# Patient Record
Sex: MALE | Race: BLACK OR AFRICAN AMERICAN | NOT HISPANIC OR LATINO | URBAN - METROPOLITAN AREA
[De-identification: names, ages, dates, MRNs, and addresses within clinical notes are randomized per-mention and may not be internally consistent; named-entity substitution may affect disease eponyms.]

---

## 2017-03-15 ENCOUNTER — EMERGENCY (EMERGENCY)
Facility: HOSPITAL | Age: 27
LOS: 0 days | Discharge: LEFT BEFORE DISCHARGE | End: 2017-03-15
Attending: EMERGENCY MEDICINE
Payer: COMMERCIAL

## 2017-03-15 VITALS
OXYGEN SATURATION: 98 % | WEIGHT: 190.04 LBS | RESPIRATION RATE: 18 BRPM | DIASTOLIC BLOOD PRESSURE: 110 MMHG | HEART RATE: 120 BPM | SYSTOLIC BLOOD PRESSURE: 186 MMHG | HEIGHT: 73 IN | TEMPERATURE: 98 F

## 2017-03-15 DIAGNOSIS — E10.9 TYPE 1 DIABETES MELLITUS WITHOUT COMPLICATIONS: ICD-10-CM
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
## 2017-03-15 DIAGNOSIS — S69.92XA UNSPECIFIED INJURY OF LEFT WRIST, HAND AND FINGER(S), INITIAL ENCOUNTER: ICD-10-CM

## 2017-03-15 DIAGNOSIS — R41.82 ALTERED MENTAL STATUS, UNSPECIFIED: ICD-10-CM
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
## 2017-03-15 LAB
ALBUMIN SERPL ELPH-MCNC: 4.5 G/DL — SIGNIFICANT CHANGE UP (ref 3.3–5)
ALP SERPL-CCNC: 102 U/L — SIGNIFICANT CHANGE UP (ref 40–120)
ALT FLD-CCNC: 33 U/L — SIGNIFICANT CHANGE UP (ref 12–78)
ANION GAP SERPL CALC-SCNC: 9 MMOL/L — SIGNIFICANT CHANGE UP (ref 5–17)
AST SERPL-CCNC: 24 U/L — SIGNIFICANT CHANGE UP (ref 15–37)
BILIRUB SERPL-MCNC: 0.7 MG/DL — SIGNIFICANT CHANGE UP (ref 0.2–1.2)
BUN SERPL-MCNC: 7 MG/DL — SIGNIFICANT CHANGE UP (ref 7–23)
CALCIUM SERPL-MCNC: 8.8 MG/DL — SIGNIFICANT CHANGE UP (ref 8.5–10.1)
CHLORIDE SERPL-SCNC: 102 MMOL/L — SIGNIFICANT CHANGE UP (ref 96–108)
CO2 SERPL-SCNC: 32 MMOL/L — HIGH (ref 22–31)
CREAT SERPL-MCNC: 0.86 MG/DL — SIGNIFICANT CHANGE UP (ref 0.5–1.3)
ETHANOL SERPL-MCNC: 168 MG/DL — HIGH (ref 0–10)
GLUCOSE SERPL-MCNC: 155 MG/DL — HIGH (ref 70–99)
POTASSIUM SERPL-MCNC: 3.4 MMOL/L — LOW (ref 3.5–5.3)
POTASSIUM SERPL-SCNC: 3.4 MMOL/L — LOW (ref 3.5–5.3)
PROT SERPL-MCNC: 8.3 GM/DL — SIGNIFICANT CHANGE UP (ref 6–8.3)
SODIUM SERPL-SCNC: 143 MMOL/L — SIGNIFICANT CHANGE UP (ref 135–145)

## 2017-03-15 PROCEDURE — 99284 EMERGENCY DEPT VISIT MOD MDM: CPT

## 2017-03-15 NOTE — ED PROVIDER NOTE - PHYSICAL EXAMINATION
Gen: Alert, NAD  Head: NC, AT   Eyes: PERRL, EOMI, normal lids/conjunctiva  ENT: normal hearing, patent oropharynx without erythema/exudate, uvula midline  Neck: supple, no tenderness, Trachea midline  Pulm: Bilateral BS, normal resp effort, no wheeze/stridor/retractions  CV: RRR, no M/R/G, 2+ radial and dp pulses bl, no edema  Abd: soft, NT/ND, +BS, no hepatosplenomegaly  Mskel: extremities x4 with normal ROM and no joint effusions. no ctl spine ttp.   Skin: no rash, no bruising   Neuro: AAOx3, no sensory/motor deficits, CN 2-12 intact. patient answering questions slowly and making unusual eye contact.
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
## 2017-03-15 NOTE — ED PROVIDER NOTE - MEDICAL DECISION MAKING DETAILS
Patient does not have externalized signs of new trauma and has no complaints. he does seem to be acting unusally. doubt this is a concussion, suspect alcohol use. will check chemistry and etoh level. Patient does not have externalized signs of new trauma and has no complaints. he does seem to be acting unusally. doubt this is a concussion, suspect alcohol use. will check chemistry and etoh level. etoh of 168 explains behavior changes. he eloped, however
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
## 2017-06-29 ENCOUNTER — APPOINTMENT (OUTPATIENT)
Dept: NEUROLOGY | Facility: CLINIC | Age: 27
End: 2017-06-29

## 2022-01-27 ENCOUNTER — EMERGENCY (EMERGENCY)
Facility: HOSPITAL | Age: 32
LOS: 0 days | Discharge: HOME | End: 2022-01-27
Attending: STUDENT IN AN ORGANIZED HEALTH CARE EDUCATION/TRAINING PROGRAM
Payer: COMMERCIAL

## 2022-01-27 VITALS
RESPIRATION RATE: 15 BRPM | DIASTOLIC BLOOD PRESSURE: 98 MMHG | OXYGEN SATURATION: 97 % | WEIGHT: 184.97 LBS | SYSTOLIC BLOOD PRESSURE: 151 MMHG | HEIGHT: 73 IN | TEMPERATURE: 98 F | HEART RATE: 118 BPM
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
## 2022-01-27 DIAGNOSIS — S09.90XA UNSPECIFIED INJURY OF HEAD, INITIAL ENCOUNTER: ICD-10-CM

## 2022-01-27 DIAGNOSIS — Z79.4 LONG TERM (CURRENT) USE OF INSULIN: ICD-10-CM

## 2022-01-27 DIAGNOSIS — R51.9 HEADACHE, UNSPECIFIED: ICD-10-CM

## 2022-01-27 DIAGNOSIS — E10.9 TYPE 1 DIABETES MELLITUS WITHOUT COMPLICATIONS: ICD-10-CM

## 2022-01-27 PROCEDURE — 99283 EMERGENCY DEPT VISIT LOW MDM: CPT

## 2022-01-27 RX ORDER — ACETAMINOPHEN 500 MG
650 TABLET ORAL ONCE
Refills: 0 | Status: COMPLETED | OUTPATIENT
Start: 2022-01-27 | End: 2022-01-27

## 2022-01-27 RX ORDER — ACETAMINOPHEN 500 MG
1 TABLET ORAL
Qty: 28 | Refills: 0
Start: 2022-01-27 | End: 2022-02-02

## 2022-01-27 RX ORDER — PROCHLORPERAZINE MALEATE 5 MG
1 TABLET ORAL
Qty: 20 | Refills: 0
Start: 2022-01-27 | End: 2022-01-31

## 2022-01-27 RX ORDER — PROCHLORPERAZINE MALEATE 5 MG
10 TABLET ORAL ONCE
Refills: 0 | Status: COMPLETED | OUTPATIENT
Start: 2022-01-27 | End: 2022-01-27
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
## 2022-01-27 NOTE — ED PROVIDER NOTE - PHYSICAL EXAMINATION
General: Awake, alert and oriented. No acute distress. Well developed, hydrated and nourished. Appears stated age.   Skin: Skin in warm, dry and intact without rashes or lesions. Appropriate color for ethnicity  HENMT: 1.5 cm well healing and scabbed laceration in superior left parietal region with no surrounding erythema ro active bleeding; bilateral external ears without swelling. no nasal discharge. moist oral mucosa. supple neck, trachea midline  EYES: Conjunctiva clear. nonicteric sclera. EOM intact, Eyelids are normal in appearance without swelling or lesions. PERRLA  Cardiac: well perfused, s1, s2, rrr  Respiratory: breathing comfortably on room air. no audible wheezing or stridor  Abdominal: nondistended  MSK: Neck and back are without deformity, visible external skin changes, or signs of trauma. Curvature of the cervical, thoracic, and lumbar spine are within normal limits. no external signs of trauma. no apparent deficits in ROM of any extremity  Neurological: The patient is awake, alert and oriented to person, place, and time with normal speech. CN 2-12 grossly intact. no apparent deficits. Memory is normal and thought process is intact. No gait abnormalities are appreciated.   Psychiatric: Appropriate mood and affect. Good judgement and insight. No visual or auditory hallucinations.

## 2022-01-27 NOTE — ED ADULT NURSE NOTE - WILL THE PATIENT ACCEPT THE PFIZER COVID-19 VACCINE IF ELIGIBLE AND IT IS AVAILABLE?
Activity:  Â· No strenuous activity (no pushing, pulling, bending, lifting, straining). Walking is allowed and stairs are ok. Diet:   Â· Advance to regular as tolerated. Medications:  Â· Take pain medication as prescribed, but realize that this medication can cause constipation. You should not operate heavy machinery with this medication. Â· You can take acetaminophen for less severe pain but do not take with any other pain medication that may also contain acetaminophen. Do not exceed the maximum daily dosage 4 g/day from all sources. Â· Take Colace or an over the counter stool softener to promote regular soft bowel movements. Avoid straining with bowel movements. Â· Avoid Aspirin/Ibuprofen products of any kind for 7 days as they can make bleeding worse. Wound Care:  Â· Change the dressing over the tube site as needed if it becomes soiled, leaking from around the tube is expected. Â· OK to shower, no bathing or swimming until neph tube is removed and incision is healed. Miscellaneous:  Â· Increase your oral fluid, it is important to drink as much fluid as possible. Â· Increase your fluid intake if your urine turns red or you pass clots to wash out any blood that may accumulate. Â· Leakage from around your flank catheter and stent is expected after this procedure. You will likely need to change the dressing from around your catheter several times a day as it will become saturated with drainage. Follow up:  Â· Follow-up with Dr. Willis Enamorado in 1 week for removal of ureteral stent. Â· Call clinic with any questions or concerns [phone]. CALL YOUR DOCTOR:  If you develop fever or chills. If you cannot urinate. If you develop nausea and are unable to drink fluids.
Yes
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
## 2022-01-27 NOTE — ED PROVIDER NOTE - CLINICAL SUMMARY MEDICAL DECISION MAKING FREE TEXT BOX
well appearing. neurologically intact. no clinical signs of ICH, low likelihood considering patients presentation. more likely is mild concussion. wound closed and healing, staples/sutures or other wound closure not indicated. received tetanus last night. will provide symptom control and dc
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
## 2022-01-27 NOTE — ED PROVIDER NOTE - PATIENT PORTAL LINK FT
You can access the FollowMyHealth Patient Portal offered by [facility] by registering at the following website: [email or web address]. By joining [facility]’s FollowMyHealth portal, you will also be able to view your health information using other applications (apps) compatible with our system.

## 2022-05-15 ENCOUNTER — INPATIENT (INPATIENT)
Facility: HOSPITAL | Age: 32
LOS: 2 days | Discharge: HOME | End: 2022-05-18
Attending: INTERNAL MEDICINE | Admitting: INTERNAL MEDICINE
Payer: SELF-PAY

## 2022-05-15 VITALS
HEART RATE: 137 BPM | RESPIRATION RATE: 20 BRPM | WEIGHT: 156.09 LBS | DIASTOLIC BLOOD PRESSURE: 97 MMHG | HEIGHT: 73 IN | OXYGEN SATURATION: 100 % | SYSTOLIC BLOOD PRESSURE: 159 MMHG | TEMPERATURE: 98 F

## 2022-05-15 DIAGNOSIS — K85.90 ACUTE PANCREATITIS WITHOUT NECROSIS OR INFECTION, UNSPECIFIED: ICD-10-CM

## 2022-05-15 DIAGNOSIS — K20.80 OTHER ESOPHAGITIS WITHOUT BLEEDING: ICD-10-CM

## 2022-05-15 DIAGNOSIS — E83.39 OTHER DISORDERS OF PHOSPHORUS METABOLISM: ICD-10-CM

## 2022-05-15 DIAGNOSIS — E86.0 DEHYDRATION: ICD-10-CM

## 2022-05-15 DIAGNOSIS — E78.5 HYPERLIPIDEMIA, UNSPECIFIED: ICD-10-CM

## 2022-05-15 DIAGNOSIS — F10.10 ALCOHOL ABUSE, UNCOMPLICATED: ICD-10-CM

## 2022-05-15 DIAGNOSIS — R74.01 ELEVATION OF LEVELS OF LIVER TRANSAMINASE LEVELS: ICD-10-CM

## 2022-05-15 DIAGNOSIS — E10.10 TYPE 1 DIABETES MELLITUS WITH KETOACIDOSIS WITHOUT COMA: ICD-10-CM

## 2022-05-15 DIAGNOSIS — K76.0 FATTY (CHANGE OF) LIVER, NOT ELSEWHERE CLASSIFIED: ICD-10-CM

## 2022-05-15 DIAGNOSIS — I10 ESSENTIAL (PRIMARY) HYPERTENSION: ICD-10-CM

## 2022-05-15 DIAGNOSIS — F12.99 CANNABIS USE, UNSPECIFIED WITH UNSPECIFIED CANNABIS-INDUCED DISORDER: ICD-10-CM

## 2022-05-15 DIAGNOSIS — Z79.4 LONG TERM (CURRENT) USE OF INSULIN: ICD-10-CM
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
## 2022-05-15 DIAGNOSIS — E87.6 HYPOKALEMIA: ICD-10-CM

## 2022-05-15 DIAGNOSIS — N17.9 ACUTE KIDNEY FAILURE, UNSPECIFIED: ICD-10-CM

## 2022-05-15 LAB
ALBUMIN SERPL ELPH-MCNC: 4.3 G/DL — SIGNIFICANT CHANGE UP (ref 3.3–5)
ALP SERPL-CCNC: 139 U/L — HIGH (ref 40–120)
ALT FLD-CCNC: 184 U/L — HIGH (ref 12–78)
AMPHET UR-MCNC: NEGATIVE — SIGNIFICANT CHANGE UP
ANION GAP SERPL CALC-SCNC: 19 MMOL/L — HIGH (ref 5–17)
ANION GAP SERPL CALC-SCNC: 36 MMOL/L — HIGH (ref 5–17)
ANISOCYTOSIS BLD QL: SLIGHT — SIGNIFICANT CHANGE UP
APPEARANCE UR: CLEAR — SIGNIFICANT CHANGE UP
AST SERPL-CCNC: 126 U/L — HIGH (ref 15–37)
BACTERIA # UR AUTO: ABNORMAL
BARBITURATES UR SCN-MCNC: NEGATIVE — SIGNIFICANT CHANGE UP
BASE EXCESS BLDA CALC-SCNC: -18.7 MMOL/L — LOW (ref -2–3)
BASE EXCESS BLDV CALC-SCNC: -7.7 MMOL/L — LOW (ref -2–3)
BASOPHILS # BLD AUTO: 0 K/UL — SIGNIFICANT CHANGE UP (ref 0–0.2)
BASOPHILS NFR BLD AUTO: 0 % — SIGNIFICANT CHANGE UP (ref 0–2)
BENZODIAZ UR-MCNC: NEGATIVE — SIGNIFICANT CHANGE UP
BILIRUB SERPL-MCNC: 1.1 MG/DL — SIGNIFICANT CHANGE UP (ref 0.2–1.2)
BILIRUB UR-MCNC: NEGATIVE — SIGNIFICANT CHANGE UP
BLOOD GAS COMMENTS ARTERIAL: SIGNIFICANT CHANGE UP
BLOOD GAS COMMENTS ARTERIAL: SIGNIFICANT CHANGE UP
BLOOD GAS COMMENTS, VENOUS: SIGNIFICANT CHANGE UP
BUN SERPL-MCNC: 10 MG/DL — SIGNIFICANT CHANGE UP (ref 7–23)
BUN SERPL-MCNC: 12 MG/DL — SIGNIFICANT CHANGE UP (ref 7–23)
CALCIUM SERPL-MCNC: 8.9 MG/DL — SIGNIFICANT CHANGE UP (ref 8.5–10.1)
CALCIUM SERPL-MCNC: 9.5 MG/DL — SIGNIFICANT CHANGE UP (ref 8.5–10.1)
CHLORIDE SERPL-SCNC: 84 MMOL/L — LOW (ref 96–108)
CHLORIDE SERPL-SCNC: 98 MMOL/L — SIGNIFICANT CHANGE UP (ref 96–108)
CO2 BLDA-SCNC: 7 MMOL/L — LOW (ref 19–24)
CO2 BLDV-SCNC: 18 MMOL/L — LOW (ref 22–26)
CO2 SERPL-SCNC: 11 MMOL/L — LOW (ref 22–31)
CO2 SERPL-SCNC: 16 MMOL/L — LOW (ref 22–31)
COCAINE METAB.OTHER UR-MCNC: NEGATIVE — SIGNIFICANT CHANGE UP
COLOR SPEC: YELLOW — SIGNIFICANT CHANGE UP
CREAT SERPL-MCNC: 1.03 MG/DL — SIGNIFICANT CHANGE UP (ref 0.5–1.3)
CREAT SERPL-MCNC: 1.54 MG/DL — HIGH (ref 0.5–1.3)
DIFF PNL FLD: NEGATIVE — SIGNIFICANT CHANGE UP
EGFR: 101 ML/MIN/1.73M2 — SIGNIFICANT CHANGE UP
EGFR: 62 ML/MIN/1.73M2 — SIGNIFICANT CHANGE UP
EOSINOPHIL # BLD AUTO: 0 K/UL — SIGNIFICANT CHANGE UP (ref 0–0.5)
EOSINOPHIL NFR BLD AUTO: 0 % — SIGNIFICANT CHANGE UP (ref 0–6)
ETHANOL SERPL-MCNC: <10 MG/DL — SIGNIFICANT CHANGE UP (ref 0–10)
FLUAV AG NPH QL: SIGNIFICANT CHANGE UP
FLUBV AG NPH QL: SIGNIFICANT CHANGE UP
GAS PNL BLDA: SIGNIFICANT CHANGE UP
GAS PNL BLDV: SIGNIFICANT CHANGE UP
GLUCOSE BLDC GLUCOMTR-MCNC: 218 MG/DL — HIGH (ref 70–99)
GLUCOSE BLDC GLUCOMTR-MCNC: 223 MG/DL — HIGH (ref 70–99)
GLUCOSE BLDC GLUCOMTR-MCNC: 244 MG/DL — HIGH (ref 70–99)
GLUCOSE BLDC GLUCOMTR-MCNC: 335 MG/DL — HIGH (ref 70–99)
GLUCOSE BLDC GLUCOMTR-MCNC: 389 MG/DL — HIGH (ref 70–99)
GLUCOSE BLDC GLUCOMTR-MCNC: 526 MG/DL — CRITICAL HIGH (ref 70–99)
GLUCOSE BLDC GLUCOMTR-MCNC: >600 MG/DL — CRITICAL HIGH (ref 70–99)
GLUCOSE SERPL-MCNC: 257 MG/DL — HIGH (ref 70–99)
GLUCOSE SERPL-MCNC: 644 MG/DL — CRITICAL HIGH (ref 70–99)
GLUCOSE UR QL: 1000 MG/DL
HCO3 BLDA-SCNC: 7 MMOL/L — CRITICAL LOW (ref 21–28)
HCO3 BLDV-SCNC: 17 MMOL/L — LOW (ref 22–28)
HCT VFR BLD CALC: 47.6 % — SIGNIFICANT CHANGE UP (ref 39–50)
HGB BLD-MCNC: 15.6 G/DL — SIGNIFICANT CHANGE UP (ref 13–17)
HOROWITZ INDEX BLDA+IHG-RTO: 21 — SIGNIFICANT CHANGE UP
HOROWITZ INDEX BLDV+IHG-RTO: 21 — SIGNIFICANT CHANGE UP
KETONES UR-MCNC: ABNORMAL
LACTATE SERPL-SCNC: 11.2 MMOL/L — CRITICAL HIGH (ref 0.7–2)
LACTATE SERPL-SCNC: 3.1 MMOL/L — HIGH (ref 0.7–2)
LEUKOCYTE ESTERASE UR-ACNC: NEGATIVE — SIGNIFICANT CHANGE UP
LIDOCAIN IGE QN: 613 U/L — HIGH (ref 73–393)
LYMPHOCYTES # BLD AUTO: 0.54 K/UL — LOW (ref 1–3.3)
LYMPHOCYTES # BLD AUTO: 3 % — LOW (ref 13–44)
MACROCYTES BLD QL: SIGNIFICANT CHANGE UP
MAGNESIUM SERPL-MCNC: 1.7 MG/DL — SIGNIFICANT CHANGE UP (ref 1.6–2.6)
MAGNESIUM SERPL-MCNC: 2.1 MG/DL — SIGNIFICANT CHANGE UP (ref 1.6–2.6)
MANUAL SMEAR VERIFICATION: SIGNIFICANT CHANGE UP
MCHC RBC-ENTMCNC: 32.8 G/DL — SIGNIFICANT CHANGE UP (ref 32–36)
MCHC RBC-ENTMCNC: 34.9 PG — HIGH (ref 27–34)
MCV RBC AUTO: 106.5 FL — HIGH (ref 80–100)
METHADONE UR-MCNC: NEGATIVE — SIGNIFICANT CHANGE UP
MONOCYTES # BLD AUTO: 1.25 K/UL — HIGH (ref 0–0.9)
MONOCYTES NFR BLD AUTO: 7 % — SIGNIFICANT CHANGE UP (ref 2–14)
NEUTROPHILS # BLD AUTO: 15.95 K/UL — HIGH (ref 1.8–7.4)
NEUTROPHILS NFR BLD AUTO: 79 % — HIGH (ref 43–77)
NEUTS BAND # BLD: 10 % — HIGH (ref 0–8)
NITRITE UR-MCNC: NEGATIVE — SIGNIFICANT CHANGE UP
NRBC # BLD: 0 /100 — SIGNIFICANT CHANGE UP (ref 0–0)
NRBC # BLD: SIGNIFICANT CHANGE UP /100 WBCS (ref 0–0)
OPIATES UR-MCNC: POSITIVE — SIGNIFICANT CHANGE UP
PCO2 BLDA: 17 MMHG — CRITICAL LOW (ref 32–46)
PCO2 BLDV: 30 MMHG — LOW (ref 42–55)
PCP SPEC-MCNC: SIGNIFICANT CHANGE UP
PCP UR-MCNC: NEGATIVE — SIGNIFICANT CHANGE UP
PH BLDA: 7.21 — LOW (ref 7.35–7.45)
PH BLDV: 7.35 — SIGNIFICANT CHANGE UP (ref 7.32–7.43)
PH UR: 5 — SIGNIFICANT CHANGE UP (ref 5–8)
PHOSPHATE SERPL-MCNC: 1.1 MG/DL — LOW (ref 2.5–4.5)
PLAT MORPH BLD: NORMAL — SIGNIFICANT CHANGE UP
PLATELET # BLD AUTO: 298 K/UL — SIGNIFICANT CHANGE UP (ref 150–400)
PO2 BLDA: 122 MMHG — HIGH (ref 83–108)
PO2 BLDV: 53 MMHG — HIGH (ref 25–45)
POTASSIUM SERPL-MCNC: 4.6 MMOL/L — SIGNIFICANT CHANGE UP (ref 3.5–5.3)
POTASSIUM SERPL-MCNC: 5.2 MMOL/L — SIGNIFICANT CHANGE UP (ref 3.5–5.3)
POTASSIUM SERPL-SCNC: 4.6 MMOL/L — SIGNIFICANT CHANGE UP (ref 3.5–5.3)
POTASSIUM SERPL-SCNC: 5.2 MMOL/L — SIGNIFICANT CHANGE UP (ref 3.5–5.3)
PROT SERPL-MCNC: 9 GM/DL — HIGH (ref 6–8.3)
PROT UR-MCNC: 15 MG/DL
RBC # BLD: 4.47 M/UL — SIGNIFICANT CHANGE UP (ref 4.2–5.8)
RBC # FLD: 12.7 % — SIGNIFICANT CHANGE UP (ref 10.3–14.5)
RBC BLD AUTO: ABNORMAL
SAO2 % BLDA: 99.6 % — HIGH (ref 94–98)
SAO2 % BLDV: 88 % — LOW (ref 94–98)
SARS-COV-2 RNA SPEC QL NAA+PROBE: SIGNIFICANT CHANGE UP
SODIUM SERPL-SCNC: 131 MMOL/L — LOW (ref 135–145)
SODIUM SERPL-SCNC: 133 MMOL/L — LOW (ref 135–145)
SP GR SPEC: 1.01 — SIGNIFICANT CHANGE UP (ref 1.01–1.02)
STOMATOCYTES BLD QL SMEAR: SLIGHT — SIGNIFICANT CHANGE UP
THC UR QL: POSITIVE — SIGNIFICANT CHANGE UP
TROPONIN I, HIGH SENSITIVITY RESULT: 41.8 NG/L — SIGNIFICANT CHANGE UP
UROBILINOGEN FLD QL: NEGATIVE MG/DL — SIGNIFICANT CHANGE UP
VARIANT LYMPHS # BLD: 1 % — SIGNIFICANT CHANGE UP (ref 0–6)
WBC # BLD: 17.92 K/UL — HIGH (ref 3.8–10.5)
WBC # FLD AUTO: 17.92 K/UL — HIGH (ref 3.8–10.5)

## 2022-05-15 PROCEDURE — 93010 ELECTROCARDIOGRAM REPORT: CPT

## 2022-05-15 PROCEDURE — 99291 CRITICAL CARE FIRST HOUR: CPT

## 2022-05-15 PROCEDURE — 71045 X-RAY EXAM CHEST 1 VIEW: CPT | Mod: 26

## 2022-05-15 PROCEDURE — 74176 CT ABD & PELVIS W/O CONTRAST: CPT | Mod: 26,MA

## 2022-05-15 RX ORDER — SODIUM CHLORIDE 9 MG/ML
1000 INJECTION, SOLUTION INTRAVENOUS ONCE
Refills: 0 | Status: COMPLETED | OUTPATIENT
Start: 2022-05-15 | End: 2022-05-15

## 2022-05-15 RX ORDER — SODIUM CHLORIDE 9 MG/ML
1000 INJECTION, SOLUTION INTRAVENOUS
Refills: 0 | Status: DISCONTINUED | OUTPATIENT
Start: 2022-05-15 | End: 2022-05-15

## 2022-05-15 RX ORDER — INSULIN HUMAN 100 [IU]/ML
7 INJECTION, SOLUTION SUBCUTANEOUS
Qty: 100 | Refills: 0 | Status: DISCONTINUED | OUTPATIENT
Start: 2022-05-15 | End: 2022-05-15

## 2022-05-15 RX ORDER — PANTOPRAZOLE SODIUM 20 MG/1
40 TABLET, DELAYED RELEASE ORAL ONCE
Refills: 0 | Status: COMPLETED | OUTPATIENT
Start: 2022-05-15 | End: 2022-05-15

## 2022-05-15 RX ORDER — SODIUM CHLORIDE 9 MG/ML
1000 INJECTION, SOLUTION INTRAVENOUS ONCE
Refills: 0 | Status: DISCONTINUED | OUTPATIENT
Start: 2022-05-15 | End: 2022-05-15

## 2022-05-15 RX ORDER — INSULIN HUMAN 100 [IU]/ML
10 INJECTION, SOLUTION SUBCUTANEOUS ONCE
Refills: 0 | Status: COMPLETED | OUTPATIENT
Start: 2022-05-15 | End: 2022-05-15

## 2022-05-15 RX ORDER — INSULIN HUMAN 100 [IU]/ML
8 INJECTION, SOLUTION SUBCUTANEOUS
Qty: 100 | Refills: 0 | Status: DISCONTINUED | OUTPATIENT
Start: 2022-05-15 | End: 2022-05-16

## 2022-05-15 RX ORDER — ACETAMINOPHEN 500 MG
1000 TABLET ORAL ONCE
Refills: 0 | Status: COMPLETED | OUTPATIENT
Start: 2022-05-15 | End: 2022-05-15

## 2022-05-15 RX ORDER — HEPARIN SODIUM 5000 [USP'U]/ML
5000 INJECTION INTRAVENOUS; SUBCUTANEOUS EVERY 12 HOURS
Refills: 0 | Status: DISCONTINUED | OUTPATIENT
Start: 2022-05-15 | End: 2022-05-15

## 2022-05-15 RX ORDER — MAGNESIUM SULFATE 500 MG/ML
2 VIAL (ML) INJECTION ONCE
Refills: 0 | Status: COMPLETED | OUTPATIENT
Start: 2022-05-15 | End: 2022-05-15

## 2022-05-15 RX ORDER — ONDANSETRON 8 MG/1
4 TABLET, FILM COATED ORAL ONCE
Refills: 0 | Status: COMPLETED | OUTPATIENT
Start: 2022-05-15 | End: 2022-05-15

## 2022-05-15 RX ORDER — MORPHINE SULFATE 50 MG/1
4 CAPSULE, EXTENDED RELEASE ORAL ONCE
Refills: 0 | Status: DISCONTINUED | OUTPATIENT
Start: 2022-05-15 | End: 2022-05-15

## 2022-05-15 RX ORDER — SODIUM CHLORIDE 9 MG/ML
2500 INJECTION, SOLUTION INTRAVENOUS ONCE
Refills: 0 | Status: COMPLETED | OUTPATIENT
Start: 2022-05-15 | End: 2022-05-15

## 2022-05-15 RX ORDER — SODIUM CHLORIDE 9 MG/ML
1000 INJECTION, SOLUTION INTRAVENOUS
Refills: 0 | Status: DISCONTINUED | OUTPATIENT
Start: 2022-05-15 | End: 2022-05-17

## 2022-05-15 RX ORDER — HEPARIN SODIUM 5000 [USP'U]/ML
5000 INJECTION INTRAVENOUS; SUBCUTANEOUS EVERY 8 HOURS
Refills: 0 | Status: DISCONTINUED | OUTPATIENT
Start: 2022-05-15 | End: 2022-05-18

## 2022-05-15 RX ORDER — CHLORHEXIDINE GLUCONATE 213 G/1000ML
1 SOLUTION TOPICAL
Refills: 0 | Status: DISCONTINUED | OUTPATIENT
Start: 2022-05-15 | End: 2022-05-18

## 2022-05-15 RX ORDER — MORPHINE SULFATE 50 MG/1
2 CAPSULE, EXTENDED RELEASE ORAL EVERY 4 HOURS
Refills: 0 | Status: DISCONTINUED | OUTPATIENT
Start: 2022-05-15 | End: 2022-05-17

## 2022-05-15 RX ORDER — PIPERACILLIN AND TAZOBACTAM 4; .5 G/20ML; G/20ML
3.38 INJECTION, POWDER, LYOPHILIZED, FOR SOLUTION INTRAVENOUS ONCE
Refills: 0 | Status: COMPLETED | OUTPATIENT
Start: 2022-05-15 | End: 2022-05-15

## 2022-05-15 RX ADMIN — ONDANSETRON 4 MILLIGRAM(S): 8 TABLET, FILM COATED ORAL at 17:17

## 2022-05-15 RX ADMIN — MORPHINE SULFATE 2 MILLIGRAM(S): 50 CAPSULE, EXTENDED RELEASE ORAL at 22:13

## 2022-05-15 RX ADMIN — SODIUM CHLORIDE 1000 MILLILITER(S): 9 INJECTION, SOLUTION INTRAVENOUS at 19:20

## 2022-05-15 RX ADMIN — Medication 85 MILLIMOLE(S): at 23:56

## 2022-05-15 RX ADMIN — INSULIN HUMAN 7 UNIT(S)/HR: 100 INJECTION, SOLUTION SUBCUTANEOUS at 19:02

## 2022-05-15 RX ADMIN — Medication 25 GRAM(S): at 23:02

## 2022-05-15 RX ADMIN — MORPHINE SULFATE 4 MILLIGRAM(S): 50 CAPSULE, EXTENDED RELEASE ORAL at 17:17

## 2022-05-15 RX ADMIN — SODIUM CHLORIDE 1000 MILLILITER(S): 9 INJECTION, SOLUTION INTRAVENOUS at 18:19

## 2022-05-15 RX ADMIN — PIPERACILLIN AND TAZOBACTAM 3.38 GRAM(S): 4; .5 INJECTION, POWDER, LYOPHILIZED, FOR SOLUTION INTRAVENOUS at 19:23

## 2022-05-15 RX ADMIN — PIPERACILLIN AND TAZOBACTAM 200 GRAM(S): 4; .5 INJECTION, POWDER, LYOPHILIZED, FOR SOLUTION INTRAVENOUS at 18:34

## 2022-05-15 RX ADMIN — PANTOPRAZOLE SODIUM 40 MILLIGRAM(S): 20 TABLET, DELAYED RELEASE ORAL at 16:48

## 2022-05-15 RX ADMIN — MORPHINE SULFATE 2 MILLIGRAM(S): 50 CAPSULE, EXTENDED RELEASE ORAL at 22:04

## 2022-05-15 RX ADMIN — INSULIN HUMAN 10 UNIT(S): 100 INJECTION, SOLUTION SUBCUTANEOUS at 18:57

## 2022-05-15 RX ADMIN — SODIUM CHLORIDE 125 MILLILITER(S): 9 INJECTION, SOLUTION INTRAVENOUS at 20:17

## 2022-05-15 RX ADMIN — HEPARIN SODIUM 5000 UNIT(S): 5000 INJECTION INTRAVENOUS; SUBCUTANEOUS at 22:17

## 2022-05-15 RX ADMIN — INSULIN HUMAN 8 UNIT(S)/HR: 100 INJECTION, SOLUTION SUBCUTANEOUS at 19:37

## 2022-05-15 RX ADMIN — Medication 1000 MILLIGRAM(S): at 20:20

## 2022-05-15 RX ADMIN — SODIUM CHLORIDE 125 MILLILITER(S): 9 INJECTION, SOLUTION INTRAVENOUS at 21:35

## 2022-05-15 RX ADMIN — SODIUM CHLORIDE 2500 MILLILITER(S): 9 INJECTION, SOLUTION INTRAVENOUS at 16:50

## 2022-05-15 RX ADMIN — ONDANSETRON 4 MILLIGRAM(S): 8 TABLET, FILM COATED ORAL at 16:47

## 2022-05-15 RX ADMIN — MORPHINE SULFATE 4 MILLIGRAM(S): 50 CAPSULE, EXTENDED RELEASE ORAL at 19:20

## 2022-05-15 RX ADMIN — SODIUM CHLORIDE 1000 MILLILITER(S): 9 INJECTION, SOLUTION INTRAVENOUS at 18:57

## 2022-05-15 RX ADMIN — Medication 400 MILLIGRAM(S): at 19:55

## 2022-05-15 NOTE — H&P ADULT - CRITICAL CARE ATTENDING COMMENT
pt admitted to ICU overnight. Seen and examined on rounds with ICU team 5/16    24 hr events:  weaned off insulin drip early AM as blood sugar dropped to the 90s on insulin drip + D5 1/2 NS.  off insulin drip on D5 1/2 NS blood sugar repeated remains in the 90s  lantus not given started on sliding scale coverage  pt feeling hungry  pt still with mild intermittent midsternal chest pain and bandlike abdominal pain  lipase rising    31M PMH type 1 DM (prior episode of DKA in Feb '22), HTN, HLD, alcohol abuse (reports drinking "a few times a week each time taking in 3-4 drinks of hard liquor", prior detox and alcohol programs but "went back to my old ways") presents to ED with nausea, abdominal pain, non bilious non bloody emesis and chest pain located in the midsternal/epigastric region. CT with esophagitis. Blood sugar 644, AG 36, Cr 1.54, HCO3: 11, pH 7.21, lactate 11, U tox (THC and opiates - pt however was given morphine in ED for pain). Admitted to ICU on insulin drip.    DX: DKA,  pancreatitis, esophagitis, LALO, dehydration, transaminitis, HTN    NEURO  awake, alert, oriented no acute issues  MVI/thiamine/folic acid  monitor on CIWA for signs of ETOH withdrawal  benzo as needed  currently calm and cooperative  mother reports pt to be depressed and drinking more, pt contemplating on alcohol cessation  offered psych eval and alcohol abuse programs, pt states "I'll let you know later if I want to do that"    CV  HTN: resume home BP medication ramipril  chest pain likely underlying esophagitis which may be from ETOH use    PULM  no acute issues    ENDO  DKA  off insulin drip overnight  blood sugar off drip, NPO, on D5 1/2 NS around 90 to 100  start insulin sliding scale coverage  if blood sugar improves will resume basal insulin   follow up HbA1C    GI  esophagitis and pancreatitis  likely alcohol related  elevated liver enzymes may also be in setting of alcohol intake/use  monitor lipase  clear liquid diet as tolerates, if lipase worsens or abdominal pain worsens will keep NPO  maintain on IVF hydration for now  GI eval for esophagitis ? need for endoscopic eval vs supportive care    RENAL  LALO in setting of dehydration in setting of DKA and osmotic diuresis  s/p IVF hydration  Cr improved  monitor electrolytes  supplement hypophosphatemia    ID  leukocytosis in setting of DKA and pancreatitis  observe off antibx  monitor for fevers  follow up blood cx    GEN  full code  DVT prophylaxis heparin sc

## 2022-05-15 NOTE — ED PROVIDER NOTE - OBJECTIVE STATEMENT
31 years old male here with mom c/o nausea vomiting and sob then mid chest pain since this morning, Pt has a hx of type I diabetes take Lantus 45 units at night and humolog three meals daily. Pt denies headache, dizziness, blurred visions, light sensitivities, focal/distal weakness or numbness, neck.back/hips/calfs pain, cough, fever, chills, abd pain, dysuria or irregular bowel movements.

## 2022-05-15 NOTE — H&P ADULT - ASSESSMENT
30yo M with PMH of type 1 DM (prior episode of DKA in Feb '22), HTN,  presents to ED with nausea, NBNB vomiting, abdominal pain, HA & chest pain since this morning. Pt states symptoms are similar to last DKA episode. In ED found to have hyperglycemia, metabolic acidosis, LALO. Will admit to ICU for management of DKA.     -Neuro: HA resolved, no acute issues.  -Cardio: Hemodynamically stable, tachycardia likely 2/2 dehydration. Maintain MAP>65. May need to restart home antihypertensives.   -Resp: No acute issues, saturating well on RA, CXR grossly clear.   -GI: Abdomen diffusely mildly tender to palpation. Lipase elevated, hx of EtOH pancreatitis on last admission per mother; CT A/P performed, read pending. NPO, hydration with IVF, zofran for nausea, analgesia. Will trend lactate.   -Renal: LALO likely prerenal in setting of dehydration. Continue fluid resuscitation, trend BUN/Cr, monitor UO. Avoid nephrotoxic agents.   -ID: Leukocytosis 17k, afebrile in ED. F/u UA, uxc, bcx, PCT. S/p zosyn x1 in ED. Trend WBC, monitor temp curve.   -Heme: SCDs, HSC for DVT ppx.  -Endocrine: Likely DKA; continue insulin gtt and IVF resuscitation. FS q1h, frequent BMPs, aggressive electrolyte repletion. When FS<250 will add dextrose to IVF. Bridge to long-acting insulin when AG closed. Pt takes lantus 45 units at home. Endo consult.   -Dispo: Admit to ICU while on insulin gtt for q1h FS   30yo M with PMH of type 1 DM (prior episode of DKA in Feb '22), HTN,  presents to ED with nausea, NBNB vomiting, abdominal pain, HA & chest pain since this morning. Pt states symptoms are similar to last DKA episode. In ED found to have hyperglycemia, metabolic acidosis, LALO. Will admit to ICU for management of DKA.     -Neuro: HA resolved, no acute issues.  -Cardio: Hemodynamically stable, tachycardia likely 2/2 dehydration. Maintain MAP>65. May need to restart home antihypertensives.   -Resp: No acute issues, saturating well on RA, CXR grossly clear.   -GI: Abdomen diffusely mildly tender to palpation. Lipase elevated, hx of EtOH pancreatitis on last admission per mother; CT A/P performed, read pending. NPO, hydration with IVF, zofran for nausea, analgesia. Will trend lactate.   -Renal: LALO likely prerenal in setting of dehydration. Continue fluid resuscitation, trend BUN/Cr, monitor UO. Avoid nephrotoxic agents.   -ID: Leukocytosis 17k, afebrile in ED. F/u UA, uxc, bcx, PCT. S/p zosyn x1 in ED. Trend WBC, monitor temp curve.   -Heme: SCDs, HSC for DVT ppx.  -Endocrine: Likely DKA; continue insulin gtt and IVF resuscitation. FS q1h, frequent BMPs, aggressive electrolyte repletion. When FS<250 will add dextrose to IVF. Bridge to long-acting insulin when AG closed. Pt takes lantus 45 units at home. Endo consult.   -Tox/Psych: Pt endorses drinking alcohol (hard liquor 3-4 days per week). Will monitor ciwa; thiamine, folate, MVI. Utox pending. If pt amenable will consider psych consult for possible depression/substance abuse.  -Dispo: Admit to ICU while on insulin gtt for q1h FS   32yo M with PMH of type 1 DM (prior episode of DKA in Feb '22), HTN,  presents to ED with nausea, NBNB vomiting, abdominal pain, HA & chest pain since this morning. Pt states symptoms are similar to last DKA episode. In ED found to have hyperglycemia, metabolic acidosis, LALO. Will admit to ICU for management of DKA.     -Neuro: HA resolved, no acute issues.  -Cardio: Hemodynamically stable, tachycardia likely 2/2 dehydration. Maintain MAP>65. May need to restart home antihypertensives. Complains of intermittent chest pain; EKG with no signs of acute ischemia, trop wnl.  -Resp: No acute issues, saturating well on RA, CXR grossly clear.   -GI: Abdomen diffusely mildly tender to palpation. Lipase elevated, hx of EtOH pancreatitis on last hospital admission per mother; CT A/P performed, read pending. NPO, hydration with IVF, zofran for nausea, analgesia. Will trend lactate.   -Renal: LALO likely prerenal in setting of dehydration. Continue fluid resuscitation, trend BUN/Cr, monitor UO. Avoid nephrotoxic agents.   -ID: Leukocytosis 17k, afebrile in ED. F/u UA, uxc, bcx, PCT. S/p zosyn x1 in ED. Trend WBC, monitor temp curve.   -Heme: SCDs, HSC for DVT ppx.  -Endocrine: Likely DKA; continue insulin gtt and IVF resuscitation. FS q1h, frequent BMPs, aggressive electrolyte repletion. When FS<250 will add dextrose to IVF. Bridge to long-acting insulin when AG closed. Pt takes lantus 45 units at home. Endo consult.   -Tox/Psych: Pt endorses drinking alcohol (hard liquor 3-4 days per week). Will monitor ciwa; thiamine, folate, MVI. Utox pending. If pt amenable will consider psych consult for possible depression/substance abuse. Prior charts indicate pt had ED psych eval in 2015; has been resistant to outpatient psych services in the past.   -Dispo: Admit to ICU while on insulin gtt for q1h FS   30yo M with PMH of type 1 DM (prior episode of DKA in Feb '22), HTN, HLD, EtOH abuse presents to ED with nausea, NBNB vomiting, abdominal pain, HA & chest pain since this morning. Pt states symptoms are similar to last DKA episode. In ED found to have hyperglycemia, metabolic acidosis, LALO. Will admit to ICU for management of DKA.     -Neuro: HA resolved, no acute issues.  -Cardio: Hemodynamically stable, tachycardia likely 2/2 dehydration. Maintain MAP>65. May need to restart home antihypertensives. Complains of intermittent chest pain; EKG with no signs of acute ischemia, trop wnl.  -Resp: No acute issues, saturating well on RA, CXR grossly clear.   -GI: Abdomen diffusely mildly tender to palpation. Lipase elevated, hx of EtOH pancreatitis on last hospital admission per mother; CT A/P performed, read pending. NPO, hydration with IVF, zofran for nausea, analgesia. Will trend lactate.   -Renal: LALO likely prerenal in setting of dehydration. Continue fluid resuscitation, trend BUN/Cr, monitor UO. Avoid nephrotoxic agents.   -ID: Leukocytosis 17k, afebrile in ED. F/u UA, uxc, bcx, PCT. S/p zosyn x1 in ED. Trend WBC, monitor temp curve.   -Heme: SCDs, HSC for DVT ppx.  -Endocrine: Likely DKA; continue insulin gtt and IVF resuscitation. FS q1h, frequent BMPs, aggressive electrolyte repletion. When FS<250 will add dextrose to IVF. Bridge to long-acting insulin when AG closed. Pt takes lantus 45 units at home. Endo consult.   -Tox/Psych: Pt endorses drinking alcohol (hard liquor 3-4 days per week). Will monitor ciwa; thiamine, folate, MVI. Utox pending. If pt amenable will consider psych consult for possible depression/substance abuse. Prior charts indicate pt had ED psych eval in 2015; has been resistant to outpatient psych services in the past.   -Dispo: Admit to ICU while on insulin gtt for q1h FS

## 2022-05-15 NOTE — ED ADULT NURSE NOTE - NSPATIENTFLAG_GEN_A_ER
Final Anesthesia Post-op Assessment    Patient: Boby Frazier  Procedure(s) Performed: RIGHT HAND LONG FINGER GANGLIONECTOMY  Anesthesia type: Monitor Anesthesia Care    Vital Last Value   Temperature 36.7 Â°C (98.1 Â°F) (07/12/17 1143)   Pulse 75 (07/12/17 1143)   Respiratory Rate 18 (07/12/17 1100)   Non-Invasive   Blood Pressure 167/81 (07/12/17 1143)   Arterial  Blood Pressure     Pulse Oximetry 97 % (07/12/17 1143)     Last 24 I/O:   Intake/Output Summary (Last 24 hours) at 07/12/17 1156  Last data filed at 07/12/17 1155   Gross per 24 hour   Intake              650 ml   Output                0 ml   Net              650 ml       PATIENT LOCATION: Phase II  POST-OP VITAL SIGNS: stable  LEVEL OF CONSCIOUSNESS: participates in exam, answers questions appropriately, alert and awake  RESPIRATORY STATUS: unassisted and spontaneous ventilation  CARDIOVASCULAR: blood pressure returned to baseline and stable  HYDRATION: euvolemic    PAIN MANAGEMENT: well controlled  NAUSEA: None  AIRWAY PATENCY: patent  POST-OP ASSESSMENT: no complications, patient tolerated procedure well with no complications, no evidence of recall and sufficiently recovered from acute administration of anesthesia effects and able to participate in evaluation  COMPLICATIONS: none  Comments: Patient reassessed and appropriate for discharge Purple DH (Discharge Huddle; Vulnerable Patient)

## 2022-05-15 NOTE — H&P ADULT - HISTORY OF PRESENT ILLNESS
30yo M with PMH of type 1 DM (prior episode of DKA in Feb '22), HTN,  presents to ED with nausea, NBNB vomiting, abdominal pain and chest pain since this morning. Pt states symptoms are similar to last DKA episode. Describes CP as 10/10, sudden onset, substernal, nonradiating; endorses SOB 2/2 pain. No numbness/tingling in extremities. Abdominal pain is generalized, nonradiating. Endorses multiple episodes of NBNB vomiting. Denies fevers, changes in bowel habits, dysuria, urinary frequency, hematuria. No recent travel, no sick contacts. Pt smokes marijuana. Drinks alcohol "hard liquor 3-4 times per week"; states he has experienced withdrawal symptoms in the past when trying to stop but no hospitalization, no seizures. Pt's mother states pt has been depressed lately and is drinking alcohol more frequently.   In ED pt found to have  30yo M with PMH of type 1 DM (prior episode of DKA in Feb '22), HTN,  presents to ED with nausea, NBNB vomiting, abdominal pain and chest pain since this morning. Pt states symptoms are similar to last DKA episode; states he has been taking his insulin as directed but says "I eat too much". Describes CP as 10/10, sudden onset, substernal, nonradiating; endorses SOB 2/2 pain. No numbness/tingling in extremities. Abdominal pain is generalized, nonradiating. Endorses multiple episodes of NBNB vomiting. Denies fevers, changes in bowel habits, dysuria, urinary frequency, hematuria. No recent travel, no sick contacts. Pt smokes marijuana. Drinks alcohol "hard liquor 3-4 times per week"; states he has experienced withdrawal symptoms in the past when trying to stop but no hospitalization, no seizures. Pt's mother states pt has been depressed lately and is drinking alcohol more frequently.   In ED pt found to have serum glucose 644, pH 7.2, bicarb 7, AG 36, Cr 1.5, LA 11, WBC 17k. ICu consulted for management of DKA.  30yo M with PMH of type 1 DM (prior episode of DKA in Feb '22), HTN, HLD, EtOH abuse presents to ED with nausea, NBNB vomiting, abdominal pain and chest pain since this morning. Pt states symptoms are similar to last DKA episode; states he has been taking his insulin as directed but says "I eat too much". Describes CP as 10/10, sudden onset, substernal, nonradiating; endorses SOB 2/2 pain. No numbness/tingling in extremities. Abdominal pain is generalized, nonradiating. Endorses multiple episodes of NBNB vomiting. Denies fevers, changes in bowel habits, dysuria, urinary frequency, hematuria. No recent travel, no sick contacts. Pt smokes marijuana. Drinks alcohol "hard liquor 3-4 times per week"; states he has experienced withdrawal symptoms in the past when trying to stop but no hospitalization, no seizures. Pt's mother states pt has been depressed lately and is drinking alcohol more frequently.   In ED pt found to have serum glucose 644, pH 7.2, bicarb 7, AG 36, Cr 1.5, LA 11, WBC 17k. ICu consulted for management of DKA.

## 2022-05-15 NOTE — ED ADULT NURSE REASSESSMENT NOTE - NS ED NURSE REASSESS COMMENT FT1
Received pt in stretcher A&Ox3, on cardiac monitoring. Insulin drip infusing at 7unit/hr. Admitted to ICU awaiting bed assignment.

## 2022-05-15 NOTE — ED ADULT NURSE NOTE - OBJECTIVE STATEMENT
pt presents to ed c/o abd pain nausea and vomiting as per mom pt has not been taking care of himself , not taking medications and not eating properly, hx typr 1 dm , Mom's  number Justina [phone] pt presents to ed c/o abd pain nausea and vomiting as per mom pt has not been taking care of himself , not taking medications and not eating well, hx type 1 dm , Mom's  number Justina [phone]

## 2022-05-15 NOTE — ED ADULT NURSE REASSESSMENT NOTE - NURSING NEURO ORIENTATION
Who is calling:  Daughter   Reason for Call:  Caller was informed of the message below. Caller verbalized understanding of the message below. Caller is questioning if the patient should take this medication up to 10 days only or should the patient take until the 30 pills is gone.  Date of last appointment with primary care: n/a  Okay to leave a detailed message: Yes  [phone]  
oriented to person, place and time
oriented to person, place and time

## 2022-05-15 NOTE — ED PROVIDER NOTE - CRITICAL CARE ATTENDING CONTRIBUTION TO CARE
31 years old came in with tachypnea rate of 30 at bed side and vomiting, accu check and abg ordered ICU consulted.

## 2022-05-15 NOTE — H&P ADULT - NSHPLABSRESULTS_GEN_ALL_CORE
15.6   17.92 )-----------( 298      ( 15 May 2022 17:33 )             47.6     05-15    131<L>  |  84<L>  |  12  ----------------------------<  644<HH>  5.2   |  11<L>  |  1.54<H>    Ca    9.5      15 May 2022 17:33  Mg     2.1     05-15    TPro  9.0<H>  /  Alb  4.3  /  TBili  1.1  /  DBili  x   /  AST  126<H>  /  ALT  184<H>  /  AlkPhos  139<H>  05-15    Lactate, Blood (05.15.22 @ 16:57)    Lactate, Blood: 11.2

## 2022-05-15 NOTE — CHART NOTE - NSCHARTNOTEFT_GEN_A_CORE
MICU team PA Note    pt c/o severe 9/10 Mid-sternal chest pain since morning time, 9/10, constant, sharp. pt states that pain worse with movement. Pt c/o sore throat since today morning. + Palpitations. Pt denies any dizziness/N/V/cough MICU team PA Note    pt c/o severe 9/10 Mid-sternal chest pain since morning time, 9/10, constant, sharp. pt states that pain worse with movement. Pt c/o sore throat since today morning. + Palpitations, intermittent. Pt states that he had the same chest pain 3 mos ago when he was admitted with DKA. pt states that pain is worse with movement. Pt states that last drink was yesterday, whisky- 3 drinks.  Pt denies any dizziness/N/V/cough/other complaints.     Vital Signs Last 24 Hrs  T(C): 36.8 (15 May 2022 20:47), Max: 36.9 (15 May 2022 19:15)  T(F): 98.3 (15 May 2022 20:47), Max: 98.4 (15 May 2022 19:15)  HR: 108 (15 May 2022 22:30) (108 - 137)  BP: 127/77 (15 May 2022 22:30) (127/77 - 159/97)  BP(mean): 88 (15 May 2022 22:30) (88 - 106)  RR: 20 (15 May 2022 22:30) (18 - 24)  SpO2: 99% (15 May 2022 22:30) (99% - 100%)    A/P"   __ pain possibly due to pancreatitis, and/or esophagitis     __ ECG -- sinus tachycardia, no ischemic changes,   -- throat with not exudate and/or swelling  -- hemodynamically stable,   -- pain did not improve with Ofirmev, __ pt states that pain improved with Morphine in ER, __ administer Morphine PRN   -- CT a/p - esophagitis,   __ consider GI consultations for the CT  findings,   __ continue monitoring, MICU team PA Note    pt c/o severe 9/10 Mid-sternal chest pain since morning time, 9/10, constant, sharp. pt states that pain worse with movement. Pt c/o sore throat since today morning. + Palpitations, intermittent. Pt states that he had the same chest pain 3 mos ago when he was admitted with DKA. pt states that pain is worse with movement. Pt states that last drink was yesterday, whisky- 3 drinks.  Pt denies any dizziness/N/V/cough/other complaints.     Vital Signs Last 24 Hrs  T(C): 36.8 (15 May 2022 20:47), Max: 36.9 (15 May 2022 19:15)  T(F): 98.3 (15 May 2022 20:47), Max: 98.4 (15 May 2022 19:15)  HR: 108 (15 May 2022 22:30) (108 - 137)  BP: 127/77 (15 May 2022 22:30) (127/77 - 159/97)  BP(mean): 88 (15 May 2022 22:30) (88 - 106)  RR: 20 (15 May 2022 22:30) (18 - 24)  SpO2: 99% (15 May 2022 22:30) (99% - 100%)    A/P:  __ pain possibly due to pancreatitis, and/or esophagitis     __ ECG -- sinus tachycardia, no ischemic changes, tele -- sinus tachycardia, no ectopy, on RA, non in acute respiratory  distress   -- hemodynamically stable,   -- throat with not exudate and/or swelling  -- pain did not improve with Ofirmev, __ pt states that pain improved with Morphine in ER, __ administer Morphine PRN   -- CT a/p - esophagitis,   __ consider GI consultations for the CT  findings,   __ continue monitoring,

## 2022-05-15 NOTE — H&P ADULT - NSHPPHYSICALEXAM_GEN_ALL_CORE
PHYSICAL EXAM  GENERAL: Pt lying in stretcher in NAD, well-groomed, well-developed.  HEENT: NCAT, PERRL, EOMI, dry mucous membranes. Neck supple, FROM, no JVD.   NERVOUS SYSTEM:  AAOx3, good concentration, moving all four extremities, no focal neuro deficits; strength & sensation intact in b/l upper & lower extremities.  CHEST/LUNG: CTABL, no w/r/r  HEART: +S1S2, regular rhythm, tachycardic, no m/r/g  ABDOMEN: Soft, diffuse mild TTP, +BS  EXTREMITIES:  2+ peripheral pulses; no c/c/e  SKIN: No rashes or lesions

## 2022-05-15 NOTE — H&P ADULT - NSHPSOCIALHISTORY_GEN_ALL_CORE
Lives at home with roommates. Unemployed at present. Smokes marijuana, Drinks alcohol 3-4 times per week.

## 2022-05-15 NOTE — H&P ADULT - NSHPROSALLOTHERNEGRD_GEN_ALL_CORE
Informed patient of information below.  Patient verbalized understanding.  Patient states she was in walk-in and given antibiotics and steroids for swollen glands of the neck.     All other review of systems negative, except as noted in HPI

## 2022-05-16 LAB
A1C WITH ESTIMATED AVERAGE GLUCOSE RESULT: 7 % — HIGH (ref 4–5.6)
ALBUMIN SERPL ELPH-MCNC: 3.2 G/DL — LOW (ref 3.3–5)
ALP SERPL-CCNC: 97 U/L — SIGNIFICANT CHANGE UP (ref 40–120)
ALT FLD-CCNC: 120 U/L — HIGH (ref 12–78)
ANION GAP SERPL CALC-SCNC: 11 MMOL/L — SIGNIFICANT CHANGE UP (ref 5–17)
AST SERPL-CCNC: 73 U/L — HIGH (ref 15–37)
B-OH-BUTYR SERPL-SCNC: 0.7 MMOL/L — HIGH
BASE EXCESS BLDV CALC-SCNC: 3.3 MMOL/L — HIGH (ref -2–3)
BASOPHILS # BLD AUTO: 0.01 K/UL — SIGNIFICANT CHANGE UP (ref 0–0.2)
BASOPHILS NFR BLD AUTO: 0.1 % — SIGNIFICANT CHANGE UP (ref 0–2)
BILIRUB SERPL-MCNC: 0.6 MG/DL — SIGNIFICANT CHANGE UP (ref 0.2–1.2)
BLOOD GAS COMMENTS, VENOUS: SIGNIFICANT CHANGE UP
BUN SERPL-MCNC: 7 MG/DL — SIGNIFICANT CHANGE UP (ref 7–23)
CALCIUM SERPL-MCNC: 9.1 MG/DL — SIGNIFICANT CHANGE UP (ref 8.5–10.1)
CHLORIDE BLDV-SCNC: 97 MMOL/L — LOW (ref 98–107)
CHLORIDE SERPL-SCNC: 100 MMOL/L — SIGNIFICANT CHANGE UP (ref 96–108)
CO2 BLDV-SCNC: 29 MMOL/L — HIGH (ref 22–26)
CO2 SERPL-SCNC: 24 MMOL/L — SIGNIFICANT CHANGE UP (ref 22–31)
CREAT SERPL-MCNC: 0.76 MG/DL — SIGNIFICANT CHANGE UP (ref 0.5–1.3)
CRP SERPL-MCNC: 14 MG/L — HIGH
CULTURE RESULTS: SIGNIFICANT CHANGE UP
EGFR: 125 ML/MIN/1.73M2 — SIGNIFICANT CHANGE UP
EOSINOPHIL # BLD AUTO: 0 K/UL — SIGNIFICANT CHANGE UP (ref 0–0.5)
EOSINOPHIL NFR BLD AUTO: 0 % — SIGNIFICANT CHANGE UP (ref 0–6)
ERYTHROCYTE [SEDIMENTATION RATE] IN BLOOD: 2 MM/HR — SIGNIFICANT CHANGE UP (ref 0–15)
ESTIMATED AVERAGE GLUCOSE: 154 MG/DL — HIGH (ref 68–114)
GAS PNL BLDV: 131 MMOL/L — LOW (ref 136–145)
GAS PNL BLDV: SIGNIFICANT CHANGE UP
GAS PNL BLDV: SIGNIFICANT CHANGE UP
GLUCOSE BLDC GLUCOMTR-MCNC: 104 MG/DL — HIGH (ref 70–99)
GLUCOSE BLDC GLUCOMTR-MCNC: 106 MG/DL — HIGH (ref 70–99)
GLUCOSE BLDC GLUCOMTR-MCNC: 138 MG/DL — HIGH (ref 70–99)
GLUCOSE BLDC GLUCOMTR-MCNC: 146 MG/DL — HIGH (ref 70–99)
GLUCOSE BLDC GLUCOMTR-MCNC: 146 MG/DL — HIGH (ref 70–99)
GLUCOSE BLDC GLUCOMTR-MCNC: 173 MG/DL — HIGH (ref 70–99)
GLUCOSE BLDC GLUCOMTR-MCNC: 177 MG/DL — HIGH (ref 70–99)
GLUCOSE BLDC GLUCOMTR-MCNC: 178 MG/DL — HIGH (ref 70–99)
GLUCOSE BLDC GLUCOMTR-MCNC: 206 MG/DL — HIGH (ref 70–99)
GLUCOSE BLDC GLUCOMTR-MCNC: 94 MG/DL — SIGNIFICANT CHANGE UP (ref 70–99)
GLUCOSE BLDC GLUCOMTR-MCNC: 96 MG/DL — SIGNIFICANT CHANGE UP (ref 70–99)
GLUCOSE BLDV-MCNC: 135 MG/DL — HIGH (ref 65–95)
GLUCOSE SERPL-MCNC: 127 MG/DL — HIGH (ref 70–99)
HCO3 BLDV-SCNC: 28 MMOL/L — SIGNIFICANT CHANGE UP (ref 22–28)
HCT VFR BLD CALC: 37.4 % — LOW (ref 39–50)
HCT VFR BLDA CALC: 40 % — SIGNIFICANT CHANGE UP (ref 37–47)
HGB BLD CALC-MCNC: 13.4 G/DL — SIGNIFICANT CHANGE UP (ref 12.6–17.4)
HGB BLD-MCNC: 13.2 G/DL — SIGNIFICANT CHANGE UP (ref 13–17)
HOROWITZ INDEX BLDV+IHG-RTO: 21 — SIGNIFICANT CHANGE UP
IMM GRANULOCYTES NFR BLD AUTO: 0.8 % — SIGNIFICANT CHANGE UP (ref 0–1.5)
LACTATE BLDV-MCNC: 1.5 MMOL/L — HIGH (ref 0.56–1.39)
LACTATE SERPL-SCNC: 1.1 MMOL/L — SIGNIFICANT CHANGE UP (ref 0.7–2)
LIDOCAIN IGE QN: 1866 U/L — HIGH (ref 73–393)
LYMPHOCYTES # BLD AUTO: 0.69 K/UL — LOW (ref 1–3.3)
LYMPHOCYTES # BLD AUTO: 5.8 % — LOW (ref 13–44)
MAGNESIUM SERPL-MCNC: 2.3 MG/DL — SIGNIFICANT CHANGE UP (ref 1.6–2.6)
MCHC RBC-ENTMCNC: 35.3 G/DL — SIGNIFICANT CHANGE UP (ref 32–36)
MCHC RBC-ENTMCNC: 35.4 PG — HIGH (ref 27–34)
MCV RBC AUTO: 100.3 FL — HIGH (ref 80–100)
MONOCYTES # BLD AUTO: 2.01 K/UL — HIGH (ref 0–0.9)
MONOCYTES NFR BLD AUTO: 17 % — HIGH (ref 2–14)
NEUTROPHILS # BLD AUTO: 9.03 K/UL — HIGH (ref 1.8–7.4)
NEUTROPHILS NFR BLD AUTO: 76.3 % — SIGNIFICANT CHANGE UP (ref 43–77)
NRBC # BLD: 0 /100 WBCS — SIGNIFICANT CHANGE UP (ref 0–0)
OTHER CELLS CSF MANUAL: 21 ML/DL — SIGNIFICANT CHANGE UP (ref 18–22)
PCO2 BLDV: 41 MMHG — LOW (ref 42–55)
PH BLDV: 7.44 — HIGH (ref 7.32–7.43)
PHOSPHATE SERPL-MCNC: 1.4 MG/DL — LOW (ref 2.5–4.5)
PLATELET # BLD AUTO: 225 K/UL — SIGNIFICANT CHANGE UP (ref 150–400)
PO2 BLDV: 56 MMHG — HIGH (ref 25–45)
POTASSIUM BLDV-SCNC: 3.7 MMOL/L — SIGNIFICANT CHANGE UP (ref 3.5–5.1)
POTASSIUM SERPL-MCNC: 3.6 MMOL/L — SIGNIFICANT CHANGE UP (ref 3.5–5.3)
POTASSIUM SERPL-SCNC: 3.6 MMOL/L — SIGNIFICANT CHANGE UP (ref 3.5–5.3)
PROCALCITONIN SERPL-MCNC: 0.14 NG/ML — HIGH (ref 0.02–0.1)
PROT SERPL-MCNC: 6.8 GM/DL — SIGNIFICANT CHANGE UP (ref 6–8.3)
RBC # BLD: 3.73 M/UL — LOW (ref 4.2–5.8)
RBC # FLD: 12 % — SIGNIFICANT CHANGE UP (ref 10.3–14.5)
SAO2 % BLDV: 90.5 % — LOW (ref 94–98)
SODIUM SERPL-SCNC: 135 MMOL/L — SIGNIFICANT CHANGE UP (ref 135–145)
SPECIMEN SOURCE: SIGNIFICANT CHANGE UP
TRIGL SERPL-MCNC: 67 MG/DL — SIGNIFICANT CHANGE UP
TSH SERPL-MCNC: 1.37 UIU/ML — SIGNIFICANT CHANGE UP (ref 0.36–3.74)
WBC # BLD: 11.84 K/UL — HIGH (ref 3.8–10.5)
WBC # FLD AUTO: 11.84 K/UL — HIGH (ref 3.8–10.5)

## 2022-05-16 PROCEDURE — 99291 CRITICAL CARE FIRST HOUR: CPT

## 2022-05-16 RX ORDER — LISINOPRIL 2.5 MG/1
40 TABLET ORAL DAILY
Refills: 0 | Status: DISCONTINUED | OUTPATIENT
Start: 2022-05-16 | End: 2022-05-18

## 2022-05-16 RX ORDER — DEXTROSE 50 % IN WATER 50 %
25 SYRINGE (ML) INTRAVENOUS ONCE
Refills: 0 | Status: DISCONTINUED | OUTPATIENT
Start: 2022-05-16 | End: 2022-05-17

## 2022-05-16 RX ORDER — SODIUM CHLORIDE 9 MG/ML
1000 INJECTION, SOLUTION INTRAVENOUS
Refills: 0 | Status: DISCONTINUED | OUTPATIENT
Start: 2022-05-16 | End: 2022-05-18

## 2022-05-16 RX ORDER — DEXTROSE 50 % IN WATER 50 %
12.5 SYRINGE (ML) INTRAVENOUS ONCE
Refills: 0 | Status: DISCONTINUED | OUTPATIENT
Start: 2022-05-16 | End: 2022-05-17

## 2022-05-16 RX ORDER — INSULIN LISPRO 100/ML
VIAL (ML) SUBCUTANEOUS EVERY 4 HOURS
Refills: 0 | Status: DISCONTINUED | OUTPATIENT
Start: 2022-05-16 | End: 2022-05-17

## 2022-05-16 RX ORDER — THIAMINE MONONITRATE (VIT B1) 100 MG
500 TABLET ORAL DAILY
Refills: 0 | Status: COMPLETED | OUTPATIENT
Start: 2022-05-16 | End: 2022-05-18

## 2022-05-16 RX ORDER — FOLIC ACID 0.8 MG
1 TABLET ORAL DAILY
Refills: 0 | Status: DISCONTINUED | OUTPATIENT
Start: 2022-05-16 | End: 2022-05-17

## 2022-05-16 RX ORDER — POTASSIUM PHOSPHATE, MONOBASIC POTASSIUM PHOSPHATE, DIBASIC 236; 224 MG/ML; MG/ML
30 INJECTION, SOLUTION INTRAVENOUS ONCE
Refills: 0 | Status: COMPLETED | OUTPATIENT
Start: 2022-05-16 | End: 2022-05-16

## 2022-05-16 RX ORDER — GLUCAGON INJECTION, SOLUTION 0.5 MG/.1ML
1 INJECTION, SOLUTION SUBCUTANEOUS ONCE
Refills: 0 | Status: DISCONTINUED | OUTPATIENT
Start: 2022-05-16 | End: 2022-05-18

## 2022-05-16 RX ORDER — DEXTROSE 50 % IN WATER 50 %
15 SYRINGE (ML) INTRAVENOUS ONCE
Refills: 0 | Status: DISCONTINUED | OUTPATIENT
Start: 2022-05-16 | End: 2022-05-17

## 2022-05-16 RX ADMIN — SODIUM CHLORIDE 125 MILLILITER(S): 9 INJECTION, SOLUTION INTRAVENOUS at 11:56

## 2022-05-16 RX ADMIN — MORPHINE SULFATE 2 MILLIGRAM(S): 50 CAPSULE, EXTENDED RELEASE ORAL at 08:20

## 2022-05-16 RX ADMIN — HEPARIN SODIUM 5000 UNIT(S): 5000 INJECTION INTRAVENOUS; SUBCUTANEOUS at 13:26

## 2022-05-16 RX ADMIN — HEPARIN SODIUM 5000 UNIT(S): 5000 INJECTION INTRAVENOUS; SUBCUTANEOUS at 21:31

## 2022-05-16 RX ADMIN — Medication 1 MILLIGRAM(S): at 11:55

## 2022-05-16 RX ADMIN — Medication 2: at 17:51

## 2022-05-16 RX ADMIN — MORPHINE SULFATE 2 MILLIGRAM(S): 50 CAPSULE, EXTENDED RELEASE ORAL at 21:46

## 2022-05-16 RX ADMIN — MORPHINE SULFATE 2 MILLIGRAM(S): 50 CAPSULE, EXTENDED RELEASE ORAL at 13:26

## 2022-05-16 RX ADMIN — SODIUM CHLORIDE 125 MILLILITER(S): 9 INJECTION, SOLUTION INTRAVENOUS at 19:12

## 2022-05-16 RX ADMIN — MORPHINE SULFATE 2 MILLIGRAM(S): 50 CAPSULE, EXTENDED RELEASE ORAL at 21:31

## 2022-05-16 RX ADMIN — MORPHINE SULFATE 2 MILLIGRAM(S): 50 CAPSULE, EXTENDED RELEASE ORAL at 13:55

## 2022-05-16 RX ADMIN — HEPARIN SODIUM 5000 UNIT(S): 5000 INJECTION INTRAVENOUS; SUBCUTANEOUS at 05:09

## 2022-05-16 RX ADMIN — POTASSIUM PHOSPHATE, MONOBASIC POTASSIUM PHOSPHATE, DIBASIC 83.33 MILLIMOLE(S): 236; 224 INJECTION, SOLUTION INTRAVENOUS at 06:06

## 2022-05-16 RX ADMIN — SODIUM CHLORIDE 125 MILLILITER(S): 9 INJECTION, SOLUTION INTRAVENOUS at 06:06

## 2022-05-16 RX ADMIN — CHLORHEXIDINE GLUCONATE 1 APPLICATION(S): 213 SOLUTION TOPICAL at 05:10

## 2022-05-16 RX ADMIN — MORPHINE SULFATE 2 MILLIGRAM(S): 50 CAPSULE, EXTENDED RELEASE ORAL at 07:52

## 2022-05-16 RX ADMIN — LISINOPRIL 40 MILLIGRAM(S): 2.5 TABLET ORAL at 19:11

## 2022-05-16 RX ADMIN — Medication 105 MILLIGRAM(S): at 11:55

## 2022-05-16 NOTE — CONSULT NOTE ADULT - ASSESSMENT
HPI:  32yo M with PMH of type 1 DM (prior episode of DKA in Feb '22), HTN, HLD, EtOH abuse presents to ED with nausea, NBNB vomiting, abdominal pain and chest pain since this morning. Pt states symptoms are similar to last DKA episode; states he has been taking his insulin as directed but says "I eat too much". Describes CP as 10/10, sudden onset, substernal, nonradiating; endorses SOB 2/2 pain. No numbness/tingling in extremities. Abdominal pain is generalized, nonradiating. Endorses multiple episodes of NBNB vomiting. Denies fevers, changes in bowel habits, dysuria, urinary frequency, hematuria. No recent travel, no sick contacts. Pt smokes marijuana. Drinks alcohol "hard liquor 3-4 times per week"; states he has experienced withdrawal symptoms in the past when trying to stop but no hospitalization, no seizures. Pt's mother states pt has been depressed lately and is drinking alcohol more frequently.   In ED pt found to have serum glucose 644, pH 7.2, bicarb 7, AG 36, Cr 1.5, LA 11, WBC 17k. ICu consulted for management of DKA.  (15 May 2022 18:52)  ----- As Above ------   Patient presented with a mid quadrant pain best described as dull / pressure like. Associated with N/V. Started the day prior to admission. He states that it is similar to the pain he had on admission when diagnsed with DKA. Patient was also diagnosed with ETOH pancreatitis in the past but it was a different type of pain. Denies any recent NSAIDs. Last ETOH was Saturday ( today is Monday ) History of ETOH abuse.   CT scan shows an essentially normal pancreas. Lipase 613 --> 1866   Patient feeling better today. ( pain better, No N/V ). No appetite but would not mind clear liquids.     A) Elevated Lipase - 613 --> 1866 Essentially normal pancreas by CT scan, different pain than previous pancreatitis - Elevation probably related to DKA rather than from the pancreas.  1) Supportive care for now ( f/u labs ) 2) Slowly advance diet as tolerated  B) Elevated transaminases - probably related to DKA -  Improving 1.1/126/184/139 --> 0.6/73/120/97 - supportive care for now . Additional blood work ordered  C) Esophagitis - Patient without any symptoms - Supportive care for now

## 2022-05-16 NOTE — PATIENT PROFILE ADULT - FUNCTIONAL ASSESSMENT - DAILY ACTIVITY 3.
[facility]    [address] [place] [id number]    Phone:  [phone]    Fax:  [phone]       Thank You for choosing us for your health care visit. We are glad to serve you and happy to provide you with this summary of your visit. Please help us to ensure we have accurate records. If you find anything that needs to be changed, please let our staff know as soon as possible.          Your Demographic Information     Patient Name Sex Babar Sanchez Male 1961       Ethnic Group Patient Race    Not of  or  Origin White      Your Visit Details     Date & Time Provider Department    2017 10:00 AM Jaylyn Maravilal NP [facility]      Your Upcoming Appointment*(Max 10)     Tuesday May 30, 2017  9:00 AM CDT   Follow-up Visit with Burak Tinoco MD   [facility] ([facility])    [address] [place], [place] [id number] [phone]            2017  1:30 PM CDT   Office Visit with SUSAN Huddleston   [facility]    [id number] [facility] [id number] [phone]              Your Vitals Were     BP Pulse Resp Height Weight SpO2    108/74 68 14 5' 10\" (1.778 m) 169 lb (76.7 kg) 98%    BMI Smoking Status                24.25 kg/m2 Never Smoker          Medications Prescribed or Re-Ordered Today     lisinopril (ZESTRIL) 2.5 MG tablet    Sig - Route: Take 1 tablet by mouth daily. - Oral    Class: Eprescribe    Pharmacy: [id number] - [address], [address]  #: [phone]      Your Current Medications Are        Disp Refills Start End    apixaban (ELIQUIS) 5 MG Tab 60 tablet 1 2017     Sig - Route: Take 1 tablet by mouth every 12 hours. - Oral    Class: Eprescribe    carvedilol (COREG) 6.25 MG tablet 60 tablet 1 2017     Sig - Route: Take 1  tablet by mouth 2 times daily (with meals). - Oral    Class: Eprescribe    digoxin (LANOXIN) 0.125 MG tablet 30 tablet 1 5/11/2017     Sig - Route: Take 1 tablet by mouth daily. - Oral    Class: Eprescribe    furosemide (LASIX) 40 MG tablet 30 tablet 11 5/11/2017     Sig - Route: Take 1 tablet by mouth daily. - Oral    Class: Eprescribe    RaNITidine HCl (RANITIDINE 75 PO)        Sig - Route: Take 1 tablet by mouth daily.  - Oral    Class: Historical Med    lisinopril (ZESTRIL) 2.5 MG tablet 30 tablet 11 5/16/2017     Sig - Route: Take 1 tablet by mouth daily. - Oral    Class: Eprescribe      Allergies     No Known Allergies      Problem List as of 5/16/2017     Palpitation    Encounter for cardioversion procedure    Cardiomyopathy (CMS/[facility])      Patient Portal Signup     Manage health care for you and your family anytime, anywhere with the new [employer], your free online resource for quick and easy access to personal health information, scheduling appointments, refilling prescriptions, viewing test results, paying bills and more.  Sign up for a free and secure account. Please follow the instructions below to securely complete your enrollment.     1. Go to [email or web address]  2. Click Sign Up Now   3. Enter the Activation Code when prompted     Activation Code: [email or web address]  Expires: 6/10/2017  8:42 AM    If you have questions related to [employer], you can email [email or web address] or call [phone] to talk to our [facility] staff.  For questions related to your health, contact your physician’s office.  Please remember to dial 911 for medical emergencies.              Patient Instructions     None       4 = No assist / stand by assistance

## 2022-05-16 NOTE — PATIENT PROFILE ADULT - FALL HARM RISK - HARM RISK INTERVENTIONS
Assistance with ambulation/Assistance OOB with selected safe patient handling equipment/Communicate Risk of Fall with Harm to all staff/Monitor for mental status changes/Monitor gait and stability/Reinforce activity limits and safety measures with patient and family/Tailored Fall Risk Interventions/Toileting schedule using arm’s reach rule for commode and bathroom/Use of alarms - bed, chair and/or voice tab/Visual Cue: Yellow wristband and red socks/Bed in lowest position, wheels locked, appropriate side rails in place/Call bell, personal items and telephone in reach/Instruct patient to call for assistance before getting out of bed or chair/Non-slip footwear when patient is out of bed/Mcdonald to call system/Physically safe environment - no spills, clutter or unnecessary equipment/Purposeful Proactive Rounding/Room/bathroom lighting operational, light cord in reach

## 2022-05-17 LAB
ALBUMIN SERPL ELPH-MCNC: 3.3 G/DL — SIGNIFICANT CHANGE UP (ref 3.3–5)
ALP SERPL-CCNC: 103 U/L — SIGNIFICANT CHANGE UP (ref 40–120)
ALT FLD-CCNC: 93 U/L — HIGH (ref 12–78)
ANION GAP SERPL CALC-SCNC: 10 MMOL/L — SIGNIFICANT CHANGE UP (ref 5–17)
AST SERPL-CCNC: 67 U/L — HIGH (ref 15–37)
BASOPHILS # BLD AUTO: 0.01 K/UL — SIGNIFICANT CHANGE UP (ref 0–0.2)
BASOPHILS NFR BLD AUTO: 0.1 % — SIGNIFICANT CHANGE UP (ref 0–2)
BILIRUB DIRECT SERPL-MCNC: 0.2 MG/DL — SIGNIFICANT CHANGE UP (ref 0–0.3)
BILIRUB INDIRECT FLD-MCNC: 0.4 MG/DL — SIGNIFICANT CHANGE UP (ref 0.2–1)
BILIRUB SERPL-MCNC: 0.6 MG/DL — SIGNIFICANT CHANGE UP (ref 0.2–1.2)
BUN SERPL-MCNC: 3 MG/DL — LOW (ref 7–23)
CALCIUM SERPL-MCNC: 9.3 MG/DL — SIGNIFICANT CHANGE UP (ref 8.5–10.1)
CHLORIDE SERPL-SCNC: 100 MMOL/L — SIGNIFICANT CHANGE UP (ref 96–108)
CHOLEST SERPL-MCNC: 217 MG/DL — HIGH
CO2 SERPL-SCNC: 26 MMOL/L — SIGNIFICANT CHANGE UP (ref 22–31)
CREAT SERPL-MCNC: 0.49 MG/DL — LOW (ref 0.5–1.3)
EGFR: 142 ML/MIN/1.73M2 — SIGNIFICANT CHANGE UP
EOSINOPHIL # BLD AUTO: 0 K/UL — SIGNIFICANT CHANGE UP (ref 0–0.5)
EOSINOPHIL NFR BLD AUTO: 0 % — SIGNIFICANT CHANGE UP (ref 0–6)
GLUCOSE BLDC GLUCOMTR-MCNC: 199 MG/DL — HIGH (ref 70–99)
GLUCOSE BLDC GLUCOMTR-MCNC: 200 MG/DL — HIGH (ref 70–99)
GLUCOSE BLDC GLUCOMTR-MCNC: 217 MG/DL — HIGH (ref 70–99)
GLUCOSE BLDC GLUCOMTR-MCNC: 246 MG/DL — HIGH (ref 70–99)
GLUCOSE BLDC GLUCOMTR-MCNC: 284 MG/DL — HIGH (ref 70–99)
GLUCOSE SERPL-MCNC: 205 MG/DL — HIGH (ref 70–99)
HAV IGM SER-ACNC: SIGNIFICANT CHANGE UP
HBV CORE IGM SER-ACNC: SIGNIFICANT CHANGE UP
HBV SURFACE AG SER-ACNC: SIGNIFICANT CHANGE UP
HCT VFR BLD CALC: 39.6 % — SIGNIFICANT CHANGE UP (ref 39–50)
HCV AB S/CO SERPL IA: 0.06 S/CO — SIGNIFICANT CHANGE UP (ref 0–0.99)
HCV AB SERPL-IMP: SIGNIFICANT CHANGE UP
HDLC SERPL-MCNC: 113 MG/DL — SIGNIFICANT CHANGE UP
HGB BLD-MCNC: 13.8 G/DL — SIGNIFICANT CHANGE UP (ref 13–17)
IMM GRANULOCYTES NFR BLD AUTO: 0.5 % — SIGNIFICANT CHANGE UP (ref 0–1.5)
LIDOCAIN IGE QN: 746 U/L — HIGH (ref 73–393)
LIPID PNL WITH DIRECT LDL SERPL: 81 MG/DL — SIGNIFICANT CHANGE UP
LYMPHOCYTES # BLD AUTO: 0.53 K/UL — LOW (ref 1–3.3)
LYMPHOCYTES # BLD AUTO: 4.9 % — LOW (ref 13–44)
MAGNESIUM SERPL-MCNC: 1.9 MG/DL — SIGNIFICANT CHANGE UP (ref 1.6–2.6)
MCHC RBC-ENTMCNC: 34.7 PG — HIGH (ref 27–34)
MCHC RBC-ENTMCNC: 34.8 G/DL — SIGNIFICANT CHANGE UP (ref 32–36)
MCV RBC AUTO: 99.5 FL — SIGNIFICANT CHANGE UP (ref 80–100)
MONOCYTES # BLD AUTO: 1.39 K/UL — HIGH (ref 0–0.9)
MONOCYTES NFR BLD AUTO: 12.8 % — SIGNIFICANT CHANGE UP (ref 2–14)
NEUTROPHILS # BLD AUTO: 8.87 K/UL — HIGH (ref 1.8–7.4)
NEUTROPHILS NFR BLD AUTO: 81.7 % — HIGH (ref 43–77)
NON HDL CHOLESTEROL: 104 MG/DL — SIGNIFICANT CHANGE UP
NRBC # BLD: 0 /100 WBCS — SIGNIFICANT CHANGE UP (ref 0–0)
PHOSPHATE SERPL-MCNC: 1.9 MG/DL — LOW (ref 2.5–4.5)
PLATELET # BLD AUTO: 211 K/UL — SIGNIFICANT CHANGE UP (ref 150–400)
POTASSIUM SERPL-MCNC: 3.4 MMOL/L — LOW (ref 3.5–5.3)
POTASSIUM SERPL-SCNC: 3.4 MMOL/L — LOW (ref 3.5–5.3)
PROT SERPL-MCNC: 6.8 GM/DL — SIGNIFICANT CHANGE UP (ref 6–8.3)
RBC # BLD: 3.98 M/UL — LOW (ref 4.2–5.8)
RBC # FLD: 12 % — SIGNIFICANT CHANGE UP (ref 10.3–14.5)
SODIUM SERPL-SCNC: 136 MMOL/L — SIGNIFICANT CHANGE UP (ref 135–145)
TRIGL SERPL-MCNC: 116 MG/DL — SIGNIFICANT CHANGE UP
WBC # BLD: 10.85 K/UL — HIGH (ref 3.8–10.5)
WBC # FLD AUTO: 10.85 K/UL — HIGH (ref 3.8–10.5)

## 2022-05-17 PROCEDURE — 99233 SBSQ HOSP IP/OBS HIGH 50: CPT

## 2022-05-17 PROCEDURE — 12345: CPT | Mod: NC

## 2022-05-17 RX ORDER — DEXTROSE 50 % IN WATER 50 %
12.5 SYRINGE (ML) INTRAVENOUS ONCE
Refills: 0 | Status: DISCONTINUED | OUTPATIENT
Start: 2022-05-17 | End: 2022-05-18

## 2022-05-17 RX ORDER — FOLIC ACID 0.8 MG
1 TABLET ORAL DAILY
Refills: 0 | Status: DISCONTINUED | OUTPATIENT
Start: 2022-05-17 | End: 2022-05-18

## 2022-05-17 RX ORDER — INSULIN LISPRO 100/ML
VIAL (ML) SUBCUTANEOUS AT BEDTIME
Refills: 0 | Status: DISCONTINUED | OUTPATIENT
Start: 2022-05-17 | End: 2022-05-18

## 2022-05-17 RX ORDER — DEXTROSE 50 % IN WATER 50 %
25 SYRINGE (ML) INTRAVENOUS ONCE
Refills: 0 | Status: DISCONTINUED | OUTPATIENT
Start: 2022-05-17 | End: 2022-05-18

## 2022-05-17 RX ORDER — INSULIN LISPRO 100/ML
VIAL (ML) SUBCUTANEOUS
Refills: 0 | Status: DISCONTINUED | OUTPATIENT
Start: 2022-05-17 | End: 2022-05-18

## 2022-05-17 RX ORDER — SODIUM CHLORIDE 9 MG/ML
1000 INJECTION, SOLUTION INTRAVENOUS
Refills: 0 | Status: DISCONTINUED | OUTPATIENT
Start: 2022-05-17 | End: 2022-05-18

## 2022-05-17 RX ORDER — POTASSIUM PHOSPHATE, MONOBASIC POTASSIUM PHOSPHATE, DIBASIC 236; 224 MG/ML; MG/ML
30 INJECTION, SOLUTION INTRAVENOUS ONCE
Refills: 0 | Status: COMPLETED | OUTPATIENT
Start: 2022-05-17 | End: 2022-05-17

## 2022-05-17 RX ORDER — ACETAMINOPHEN 500 MG
1000 TABLET ORAL ONCE
Refills: 0 | Status: COMPLETED | OUTPATIENT
Start: 2022-05-17 | End: 2022-05-17

## 2022-05-17 RX ORDER — GLUCAGON INJECTION, SOLUTION 0.5 MG/.1ML
1 INJECTION, SOLUTION SUBCUTANEOUS ONCE
Refills: 0 | Status: DISCONTINUED | OUTPATIENT
Start: 2022-05-17 | End: 2022-05-18

## 2022-05-17 RX ORDER — POTASSIUM CHLORIDE 20 MEQ
40 PACKET (EA) ORAL ONCE
Refills: 0 | Status: COMPLETED | OUTPATIENT
Start: 2022-05-17 | End: 2022-05-17

## 2022-05-17 RX ORDER — DEXTROSE 50 % IN WATER 50 %
15 SYRINGE (ML) INTRAVENOUS ONCE
Refills: 0 | Status: DISCONTINUED | OUTPATIENT
Start: 2022-05-17 | End: 2022-05-18

## 2022-05-17 RX ORDER — INSULIN GLARGINE 100 [IU]/ML
10 INJECTION, SOLUTION SUBCUTANEOUS ONCE
Refills: 0 | Status: COMPLETED | OUTPATIENT
Start: 2022-05-17 | End: 2022-05-17

## 2022-05-17 RX ORDER — INSULIN GLARGINE 100 [IU]/ML
10 INJECTION, SOLUTION SUBCUTANEOUS AT BEDTIME
Refills: 0 | Status: DISCONTINUED | OUTPATIENT
Start: 2022-05-18 | End: 2022-05-18

## 2022-05-17 RX ADMIN — Medication 1000 MILLIGRAM(S): at 16:50

## 2022-05-17 RX ADMIN — Medication 105 MILLIGRAM(S): at 11:28

## 2022-05-17 RX ADMIN — Medication 40 MILLIEQUIVALENT(S): at 03:25

## 2022-05-17 RX ADMIN — Medication 2: at 01:53

## 2022-05-17 RX ADMIN — HEPARIN SODIUM 5000 UNIT(S): 5000 INJECTION INTRAVENOUS; SUBCUTANEOUS at 14:23

## 2022-05-17 RX ADMIN — HEPARIN SODIUM 5000 UNIT(S): 5000 INJECTION INTRAVENOUS; SUBCUTANEOUS at 05:16

## 2022-05-17 RX ADMIN — Medication 400 MILLIGRAM(S): at 16:27

## 2022-05-17 RX ADMIN — Medication 4: at 16:26

## 2022-05-17 RX ADMIN — LISINOPRIL 40 MILLIGRAM(S): 2.5 TABLET ORAL at 05:16

## 2022-05-17 RX ADMIN — SODIUM CHLORIDE 125 MILLILITER(S): 9 INJECTION, SOLUTION INTRAVENOUS at 03:01

## 2022-05-17 RX ADMIN — POTASSIUM PHOSPHATE, MONOBASIC POTASSIUM PHOSPHATE, DIBASIC 83.33 MILLIMOLE(S): 236; 224 INJECTION, SOLUTION INTRAVENOUS at 03:25

## 2022-05-17 RX ADMIN — Medication 6: at 10:23

## 2022-05-17 RX ADMIN — Medication 1 MILLIGRAM(S): at 11:26

## 2022-05-17 RX ADMIN — MORPHINE SULFATE 2 MILLIGRAM(S): 50 CAPSULE, EXTENDED RELEASE ORAL at 05:35

## 2022-05-17 RX ADMIN — HEPARIN SODIUM 5000 UNIT(S): 5000 INJECTION INTRAVENOUS; SUBCUTANEOUS at 21:36

## 2022-05-17 RX ADMIN — INSULIN GLARGINE 10 UNIT(S): 100 INJECTION, SOLUTION SUBCUTANEOUS at 18:39

## 2022-05-17 RX ADMIN — MORPHINE SULFATE 2 MILLIGRAM(S): 50 CAPSULE, EXTENDED RELEASE ORAL at 05:22

## 2022-05-17 RX ADMIN — Medication 4: at 05:16

## 2022-05-17 NOTE — PROGRESS NOTE ADULT - ASSESSMENT
HPI:  32yo M with PMH of type 1 DM (prior episode of DKA in Feb '22), HTN, HLD, EtOH abuse presents to ED with nausea, NBNB vomiting, abdominal pain and chest pain since this morning. Pt states symptoms are similar to last DKA episode; states he has been taking his insulin as directed but says "I eat too much". Describes CP as 10/10, sudden onset, substernal, nonradiating; endorses SOB 2/2 pain. No numbness/tingling in extremities. Abdominal pain is generalized, nonradiating. Endorses multiple episodes of NBNB vomiting. Denies fevers, changes in bowel habits, dysuria, urinary frequency, hematuria. No recent travel, no sick contacts. Pt smokes marijuana. Drinks alcohol "hard liquor 3-4 times per week"; states he has experienced withdrawal symptoms in the past when trying to stop but no hospitalization, no seizures. Pt's mother states pt has been depressed lately and is drinking alcohol more frequently.   In ED pt found to have serum glucose 644, pH 7.2, bicarb 7, AG 36, Cr 1.5, LA 11, WBC 17k. ICu consulted for management of DKA.  (15 May 2022 18:52)  ----- As Above ------   Patient presented with a mid quadrant pain best described as dull / pressure like. Associated with N/V. Started the day prior to admission. He states that it is similar to the pain he had on admission when diagnsed with DKA. Patient was also diagnosed with ETOH pancreatitis in the past but it was a different type of pain. Denies any recent NSAIDs. Last ETOH was Saturday ( today is Monday ) History of ETOH abuse.   CT scan shows an essentially normal pancreas. Lipase 613 --> 1866   Patient feeling better today. ( pain better, No N/V ). No appetite but would not mind clear liquids.     A) Elevated Lipase - 613 --> 1866 --> 746  Essentially normal pancreas by CT scan, different pain than previous pancreatitis - Elevation probably related to DKA rather than from the pancreas.  1) Supportive care for now ( f/u labs ) 2) Slowly advance diet as tolerated  B) Elevated transaminases - probably related to DKA -  Improving 1.1/126/184/139 --> 0.6/73/120/97 - 0.6/67/93/103 supportive care for now . Additional blood work ordered  C) Esophagitis - Patient without any symptoms - Supportive care for now

## 2022-05-17 NOTE — PROGRESS NOTE ADULT - SUBJECTIVE AND OBJECTIVE BOX
Patient is a 31y old  Male who presents with a chief complaint of DKA (16 May 2022 14:17)      HPI:  30yo M with PMH of type 1 DM (prior episode of DKA in ), HTN, HLD, EtOH abuse presents to ED with nausea, NBNB vomiting, abdominal pain and chest pain since this morning. Pt states symptoms are similar to last DKA episode; states he has been taking his insulin as directed but says "I eat too much". Describes CP as 10/10, sudden onset, substernal, nonradiating; endorses SOB 2/2 pain. No numbness/tingling in extremities. Abdominal pain is generalized, nonradiating. Endorses multiple episodes of NBNB vomiting. Denies fevers, changes in bowel habits, dysuria, urinary frequency, hematuria. No recent travel, no sick contacts. Pt smokes marijuana. Drinks alcohol "hard liquor 3-4 times per week"; states he has experienced withdrawal symptoms in the past when trying to stop but no hospitalization, no seizures. Pt's mother states pt has been depressed lately and is drinking alcohol more frequently.   In ED pt found to have serum glucose 644, pH 7.2, bicarb 7, AG 36, Cr 1.5, LA 11, WBC 17k. ICu consulted for management of DKA.  (15 May 2022 18:52)      INTERVAL HPI/OVERNIGHT EVENTS:  Vague epigastric pain, anterior axillary pain, chest pain and back pain. Tolerating clear liquid diet. The patient denies melena, hematochezia, hematemesis, nausea, vomiting, constipation, diarrhea, or change in bowel movements     MEDICATIONS  (STANDING):  chlorhexidine 2% Cloths 1 Application(s) Topical <User Schedule>  dextrose 5%. 1000 milliLiter(s) (50 mL/Hr) IV Continuous <Continuous>  dextrose 5%. 1000 milliLiter(s) (100 mL/Hr) IV Continuous <Continuous>  dextrose 5%. 1000 milliLiter(s) (50 mL/Hr) IV Continuous <Continuous>  dextrose 5%. 1000 milliLiter(s) (100 mL/Hr) IV Continuous <Continuous>  dextrose 50% Injectable 25 Gram(s) IV Push once  dextrose 50% Injectable 12.5 Gram(s) IV Push once  dextrose 50% Injectable 25 Gram(s) IV Push once  folic acid 1 milliGRAM(s) Oral daily  glucagon  Injectable 1 milliGRAM(s) IntraMuscular once  glucagon  Injectable 1 milliGRAM(s) IntraMuscular once  heparin   Injectable 5000 Unit(s) SubCutaneous every 8 hours  insulin glargine Injectable (LANTUS) 10 Unit(s) SubCutaneous once  insulin lispro (ADMELOG) corrective regimen sliding scale   SubCutaneous three times a day before meals  insulin lispro (ADMELOG) corrective regimen sliding scale   SubCutaneous at bedtime  lisinopril 40 milliGRAM(s) Oral daily  thiamine IVPB 500 milliGRAM(s) IV Intermittent daily    MEDICATIONS  (PRN):  dextrose Oral Gel 15 Gram(s) Oral once PRN Blood Glucose LESS THAN 70 milliGRAM(s)/deciliter  LORazepam   Injectable 2 milliGRAM(s) IV Push every 1 hour PRN CIWA-Ar score 8 or greater  morphine  - Injectable 2 milliGRAM(s) IV Push every 4 hours PRN Severe Pain (7 - 10)      FAMILY HISTORY:      Allergies    No Known Allergies    Intolerances        PMH/PSH:  DM (diabetes mellitus), type 1          REVIEW OF SYSTEMS:  CONSTITUTIONAL: No fever, weight loss, or fatigue  EYES: No eye pain, visual disturbances, or discharge  ENMT:  No difficulty hearing, tinnitus, vertigo; No sinus or throat pain  NECK: No pain or stiffness  BREASTS: No pain, masses, or nipple discharge  RESPIRATORY: No cough, wheezing, chills or hemoptysis; No shortness of breath  CARDIOVASCULAR: No chest pain, palpitations, dizziness, or leg swelling  GASTROINTESTINAL: See above   GENITOURINARY: No dysuria, frequency, hematuria, or incontinence  NEUROLOGICAL: No headaches, memory loss, numbness, or tremors  SKIN: No itching, burning, rashes, or lesions   LYMPH NODES: No enlarged glands  ENDOCRINE: No heat or cold intolerance; No hair loss  MUSCULOSKELETAL: No joint pain or swelling; No muscle, back, or extremity pain  PSYCHIATRIC: No depression, anxiety, mood swings, or difficulty sleeping  HEME/LYMPH: No easy bruising, or bleeding gums  ALLERGY AND IMMUNOLOGIC: No hives or eczema    Vital Signs Last 24 Hrs  T(C): 36.3 (17 May 2022 08:00), Max: 37.5 (17 May 2022 00:02)  T(F): 97.3 (17 May 2022 08:00), Max: 99.5 (17 May 2022 00:02)  HR: 111 (17 May 2022 13:00) (83 - 119)  BP: 155/96 (17 May 2022 13:00) (141/93 - 167/112)  BP(mean): 109 (17 May 2022 13:00) (102 - 126)  RR: 21 (17 May 2022 13:00) (12 - 23)  SpO2: 99% (17 May 2022 06:00) (97% - 100%)    PHYSICAL EXAM:  GENERAL: NAD, well-groomed, well-developed  HEAD:  Atraumatic, Normocephalic  EYES: EOMI, PERRLA, conjunctiva and sclera clear  NECK: Supple, No JVD, Normal thyroid  NERVOUS SYSTEM:  Alert & Oriented X3, Good concentration;   CHEST/LUNG: Clear to percussion bilaterally; No rales, rhonchi, wheezing, or rubs  HEART: Regular rate and rhythm; No murmurs, rubs, or gallops  ABDOMEN: Soft, Nontender, Nondistended; Bowel sounds present  EXTREMITIES:  2+ Peripheral Pulses, No clubbing, cyanosis, or edema  LYMPH: No lymphadenopathy noted  SKIN: No rashes or lesions    LAB   @ 02:16  amylase --   lipase 746    @ 04:19  amylase --   lipase 1866   05-15 @ 17:33  amylase --   lipase 613                           13.8   10.85 )-----------( 211      ( 17 May 2022 02:16 )             39.6       CBC:   @ 02:16  WBC 10.85   Hgb 13.8   Hct 39.6   Plts 211  MCV 99.5   @ 04:19  WBC 11.84   Hgb 13.2   Hct 37.4   Plts 225  .3  05-15 @ 17:33  WBC 17.92   Hgb 15.6   Hct 47.6   Plts 298  .5      Chemistry:   @ 02:16  Na+ 136  K+ 3.4  Cl- 100  CO2 26  BUN 3  Cr 0.49      @ 04:19  Na+ 135  K+ 3.6  Cl- 100  CO2 24  BUN 7  Cr 0.76     05-15 @ 21:49  Na+ 133  K+ 4.6  Cl- 98  CO2 16  BUN 10  Cr 1.03     05-15 @ 17:33  Na+ 131  K+ 5.2  Cl- 84  CO2 11  BUN 12  Cr 1.54         Glucose, Serum: 205 mg/dL ( @ 02:16)  Glucose, Serum: 127 mg/dL ( @ 04:19)  Glucose, Serum: 257 mg/dL (05-15 @ 21:49)  Glucose, Serum: 644 mg/dL (05-15 @ 17:33)      17 May 2022 02:16    136    |  100    |  3      ----------------------------<  205    3.4     |  26     |  0.49   16 May 2022 04:19    135    |  100    |  7      ----------------------------<  127    3.6     |  24     |  0.76   15 May 2022 21:49    133    |  98     |  10     ----------------------------<  257    4.6     |  16     |  1.03   15 May 2022 17:33    131    |  84     |  12     ----------------------------<  644    5.2     |  11     |  1.54     Ca    9.3        17 May 2022 02:16  Ca    9.1        16 May 2022 04:19  Ca    8.9        15 May 2022 21:49  Ca    9.5        15 May 2022 17:33  Phos  1.9       17 May 2022 02:16  Phos  1.4       16 May 2022 04:19  Phos  1.1       15 May 2022 21:49  Mg     1.9       17 May 2022 02:16  Mg     2.3       16 May 2022 04:19  Mg     1.7       15 May 2022 21:49  Mg     2.1       15 May 2022 17:33    TPro  6.8    /  Alb  3.3    /  TBili  0.6    /  DBili  0.2    /  AST  67     /  ALT  93     /  AlkPhos  103    17 May 2022 02:16  TPro  6.8    /  Alb  3.2    /  TBili  0.6    /  DBili  x      /  AST  73     /  ALT  120    /  AlkPhos  97     16 May 2022 04:19  TPro  9.0    /  Alb  4.3    /  TBili  1.1    /  DBili  x      /  AST  126    /  ALT  184    /  AlkPhos  139    15 May 2022 17:33          Urinalysis Basic - ( 15 May 2022 19:50 )    Color: Yellow / Appearance: Clear / S.015 / pH: x  Gluc: x / Ketone: Large  / Bili: Negative / Urobili: Negative mg/dL   Blood: x / Protein: 15 mg/dL / Nitrite: Negative   Leuk Esterase: Negative / RBC: x / WBC x   Sq Epi: x / Non Sq Epi: x / Bacteria: Few        CAPILLARY BLOOD GLUCOSE      POCT Blood Glucose.: 284 mg/dL (17 May 2022 10:13)  POCT Blood Glucose.: 217 mg/dL (17 May 2022 05:14)  POCT Blood Glucose.: 199 mg/dL (17 May 2022 01:51)  POCT Blood Glucose.: 146 mg/dL (16 May 2022 21:25)  POCT Blood Glucose.: 177 mg/dL (16 May 2022 17:38)      C-Reactive Protein, Serum: 14 mg/L ( @ 09:10)      RADIOLOGY & ADDITIONAL TESTS:    Imaging Personally Reviewed:  [ ] YES  [ ] NO    Consultant(s) Notes Reviewed:  [ ] YES  [ ] NO    Care Discussed with Consultants/Other Providers [ ] YES  [ ] NO

## 2022-05-17 NOTE — CHART NOTE - NSCHARTNOTEFT_GEN_A_CORE
This is a 30yo M with PMH of type 1 DM (prior episode of DKA in Feb '22), HTN, HLD, EtOH abuse who presented to [facility] on 5/15 with nausea, NBNB vomiting, abdominal pain and chest pain, stating he feels similar to last DKA episode. Pt had reported compliance with insulin regimine, but says he had been "eating too much" and drinking alcohol more frequently. In ED, pt found to have serum glucose 644, pH 7.2, Bicarb 7, AG 36, Cr 1.5, lactate 11, leukocytosis of 17k and lipase of 1866. CT AP showed Distal esophageal thickening and Severe hepatic steatosis. Patient was admitted to ICU for DKA, LALO, lactic acidosis and esophagitis, placed on insulin drip with fluid resuscitation.     Following admission, patients      this morning. Pt states symptoms are similar to last DKA episode; states he has been taking his insulin as directed but says "I eat too much". This is a 30yo M with PMH of type 1 DM (prior episode of DKA in Feb '22), HTN, HLD, EtOH abuse who presented to [facility] on 5/15 with nausea, NBNB vomiting, abdominal pain and chest pain, stating he feels similar to last DKA episode. Pt had reported compliance with insulin regimine, but says he had been "eating too much" and drinking alcohol more frequently. In ED, pt found to have serum glucose 644, pH 7.2, Bicarb 7, AG 36, Cr 1.5, lactate 11, leukocytosis of 17k and lipase of 1866. CT AP showed Distal esophageal thickening and Severe hepatic steatosis. Patient was admitted to ICU for DKA, LALO, lactic acidosis and esophagitis, placed on insulin drip with fluid resuscitation.     Following admission, patients Anion gap closed and he was titrated off of insulin drip and started on ISS with D5 1/2 NS in addition to PO diet. His lipase levels have continued to down trend and he reports symptomatic improvement of N/V and abdominal/chest pain. Per GI plan to continue to trend lipase and LFTs, progress diet as tolerated. He remains afebrile, with downtrending leukocytosis off of antibiotics.     Patient seen and examined by ICU attending and stable for transfer to medical floors. Report given to ___ and placed under ___ service. This is a 32yo M with PMH of type 1 DM (prior episode of DKA in Feb '22), HTN, HLD, EtOH abuse who presented to [facility] on 5/15 with nausea, NBNB vomiting, abdominal pain and chest pain, stating he feels similar to last DKA episode. Pt had reported compliance with insulin regimen, but says he had been "eating too much" and drinking alcohol more frequently. In ED, pt found to have serum glucose 644, pH 7.2, Bicarb 7, AG 36, Cr 1.5, lactate 11, leukocytosis of 17k and lipase of 1866. CT AP showed Distal esophageal thickening and Severe hepatic steatosis. Patient was admitted to ICU for DKA, LALO, lactic acidosis and esophagitis, placed on insulin drip with fluid resuscitation.     Following admission, patients Anion gap closed and he was titrated off of insulin drip and started on ISS with D5 1/2 NS in addition to PO diet. His lipase levels have continued to down trend and he reports symptomatic improvement of N/V and abdominal/chest pain. Per GI plan to continue to trend lipase and LFTs, progress diet as tolerated. He remains afebrile, with downtrending leukocytosis off of antibiotics. In addition, pt was initially started on CIWA protocol due to hx of etoh use, but had not required any doses of ativan and is not showing active signs of withdrawals.     Patient seen and examined by ICU attending and stable for transfer to medical floors. Report given to ___ and placed under ___ service. This is a 30yo M with PMH of type 1 DM (prior episode of DKA in Feb '22), HTN, HLD, EtOH abuse who presented to [facility] on 5/15 with nausea, NBNB vomiting, abdominal pain and chest pain, stating he feels similar to last DKA episode. Pt had reported compliance with insulin regimen, but says he had been "eating too much" and drinking alcohol more frequently. In ED, pt found to have serum glucose 644, pH 7.2, Bicarb 7, AG 36, Cr 1.5, lactate 11, leukocytosis of 17k and lipase of 1866. CT AP showed Distal esophageal thickening and Severe hepatic steatosis. Patient was admitted to ICU for DKA, LALO, lactic acidosis and esophagitis, placed on insulin drip with fluid resuscitation.     Following admission, patients Anion gap closed and he was titrated off of insulin drip and started on ISS with D5 1/2 NS in addition to PO diet. His lipase levels have continued to down trend and he reports symptomatic improvement of N/V and abdominal/chest pain. Per GI plan to continue to trend lipase and LFTs, progress diet as tolerated. He remains afebrile, with downtrending leukocytosis off of antibiotics. In addition, pt was initially started on CIWA protocol due to hx of etoh use, but had not required any doses of ativan and is not showing active signs of withdrawals.     Patient seen and examined by ICU attending and stable for transfer to medical floors. Report given to Dr. Stearns.

## 2022-05-17 NOTE — PROGRESS NOTE ADULT - ASSESSMENT
31M PMH type 1 DM (prior episode of DKA in Feb '22), HTN, HLD, alcohol abuse (reports drinking "a few times a week each time taking in 3-4 drinks of hard liquor", prior detox and alcohol programs but "went back to my old ways") presents to ED with nausea, abdominal pain, non bilious non bloody emesis and chest pain located in the midsternal/epigastric region. CT with esophagitis. Blood sugar 644, AG 36, Cr 1.54, HCO3: 11, pH 7.21, lactate 11, U tox (THC and opiates - pt however was given morphine in ED for pain). Admitted to ICU on insulin drip.    DX: DKA, esophagitis, LALO, dehydration, transaminitis, abdominal pain, pancreatitis, HTN    NEURO  awake, alert, oriented no acute issues  MVI/thiamine/folic acid  monitor on CIWA for signs of ETOH withdrawal  CIWA = 0  benzo as needed  currently calm and cooperative  discussed with patient need for alcohol cessation    CV  HTN: cont lisinopril  chest pain likely underlying esophagitis which may be from ETOH use  troponin negative    PULM  no acute issues    ENDO  DKA  off insulin drip x24 hours  blood sugar now improved from the 90s to 200s  advance liquid diet to diabetic low salt diet  with improvement in blood sugar will start lantus 10 units  may need to increase basal insulin as PO intake improves (reports taking lantus 45 units at home)  cont insulin sliding scale coverage      GI  esophagitis and pancreatitis  ? alcohol related  elevated liver enzymes may also be in setting of alcohol intake/use  monitor lipase currently downtrending  advance diet from clear liquids to regular diet  d/c IVF  abdominal pain improved (abdominal pain from DKA vs pancreatitis)  appreciate GI eval: cont supportive care      RENAL  LALO in setting of dehydration with DKA and osmotic diuresis  s/p IVF hydration  Cr improved  monitor electrolytes  supplement hypophosphatemia, and hypokalemia    ID  leukocytosis in setting of DKA and pancreatitis  observe off antibx  cultures negative to date  WBC improving    GEN  full code  DVT prophylaxis heparin sc  pt ambulating in unit  stable for transfer to medical floor  d/w patient plan of care

## 2022-05-17 NOTE — PROGRESS NOTE ADULT - SUBJECTIVE AND OBJECTIVE BOX
24 hr events:  tolerating liquid diet  "chest pain" and abdominal pain improved  off insulin drip      ## ROS:  no fever, no chills, no fatigue  no HA, no dizziness  no sore throat  no SOB, no cough  chest pain improved and subsided  abdominal pain improved  no nausea, no emesis  no dysphagia  no dysuria      ## Labs:  CBC:                        13.8   10.85 )-----------( 211      ( 17 May 2022 02:16 )             39.6     Chem:  05-17    136  |  100  |  3<L>  ----------------------------<  205<H>  3.4<L>   |  26  |  0.49<L>    Ca    9.3      17 May 2022 02:16  Phos  1.9     05-17  Mg     1.9     05-17    TPro  6.8  /  Alb  3.3  /  TBili  0.6  /  DBili  0.2  /  AST  67<H>  /  ALT  93<H>  /  AlkPhos  103  05-17    Lipase, Serum in AM (05.17.22 @ 02:16)    Lipase, Serum: 746 U/L    Lipase, Serum in AM (05.16.22 @ 04:19)    Lipase, Serum: 1866 U/L    Troponin I, High Sensitivity (05.15.22 @ 17:33)    Troponin I, High Sensitivity Result: 41.8 ng/L    Culture - Urine (05.16.22 @ 00:55)    Specimen Source: Clean Catch Clean Catch (Midstream)    Culture Results: <10,000 CFU/mL Normal Urogenital Princess    Culture - Blood (05.15.22 @ 22:19)    Specimen Source: .Blood Blood-Peripheral    Culture Results: No growth to date.    A1C with Estimated Average Glucose (05.16.22 @ 09:11)    A1C with Estimated Average Glucose Result: 7.0 %    Estimated Average Glucose: 154 mg/dL      ## Imaging:  CT abdomen/pelvis < from: CT Abdomen and Pelvis No Cont (05.15.22 @ 17:41) >  Distal esophageal thickening. Correlate for esophagitis.  Severe hepatic steatosis.        ## Medications:  lisinopril 40 milliGRAM(s) Oral daily    dextrose 50% Injectable 25 Gram(s) IV Push once  dextrose 50% Injectable 12.5 Gram(s) IV Push once  dextrose 50% Injectable 25 Gram(s) IV Push once  dextrose Oral Gel 15 Gram(s) Oral once PRN  glucagon  Injectable 1 milliGRAM(s) IntraMuscular once  glucagon  Injectable 1 milliGRAM(s) IntraMuscular once  insulin glargine Injectable (LANTUS) 10 Unit(s) SubCutaneous once  insulin lispro (ADMELOG) corrective regimen sliding scale   SubCutaneous three times a day before meals  insulin lispro (ADMELOG) corrective regimen sliding scale   SubCutaneous at bedtime    heparin   Injectable 5000 Unit(s) SubCutaneous every 8 hours      LORazepam   Injectable 2 milliGRAM(s) IV Push every 1 hour PRN  morphine  - Injectable 2 milliGRAM(s) IV Push every 4 hours PRN      ## Vitals:  T(C): 37.3 (05-17-22 @ 15:04), Max: 37.5 (05-17-22 @ 00:02)  HR: 102 (05-17-22 @ 16:00) (83 - 119)  BP: 154/95 (05-17-22 @ 16:00) (141/93 - 167/112)  BP(mean): 106 (05-17-22 @ 16:00) (102 - 126)  RR: 16 (05-17-22 @ 16:00) (12 - 23)  SpO2: 99% (05-17-22 @ 06:00) (97% - 100%)        05-16 @ 07:01  -  05-17 @ 07:00  --------------------------------------------------------  IN: 3600 mL / OUT: 2150 mL / NET: 1450 mL    05-17 @ 07:01  -  05-17 @ 18:19  --------------------------------------------------------  IN: 725 mL / OUT: 0 mL / NET: 725 mL          ## P/E:  Gen: young male, in no distress, lying comfortably in bed  HEENT: EOMI, sclera white  Resp: CTA B/L , no wheeze  CVS: RRR  Abd: soft NT/ND +BS  Ext: no c/c/e  Neuro: A&Ox3, no focal deficits    CENTRAL LINE: [ ] YES [x ] NO  LOCATION:   DATE INSERTED:  REMOVE: [ ] YES [ ] NO      EDWARDS: [ ] YES [x ] NO    DATE INSERTED:  REMOVE:  [ ] YES [ ] NO      A-LINE:  [ ] YES [x ] NO  LOCATION:   DATE INSERTED:  REMOVE:  [ ] YES [ ] NO  EXPLAIN:    GLOBAL ISSUE/BEST PRACTICE:  Analgesia: as needed  Sedation: n/a  HOB elevation: yes  Stress ulcer prophylaxis: n/a  VTE prophylaxis: heparin sc  Oral Care: n/a  Glycemic control: lantus, sliding scale coverage  Nutrition: diabetic diet    CODE STATUS: [x ] full code  [ ] DNR  [ ] DNI  [ ] MOLST  Goals of care discussion: [ ] yes

## 2022-05-18 ENCOUNTER — TRANSCRIPTION ENCOUNTER (OUTPATIENT)
Age: 32
End: 2022-05-18

## 2022-05-18 VITALS
OXYGEN SATURATION: 99 % | SYSTOLIC BLOOD PRESSURE: 132 MMHG | HEART RATE: 115 BPM | TEMPERATURE: 98 F | RESPIRATION RATE: 18 BRPM | DIASTOLIC BLOOD PRESSURE: 88 MMHG

## 2022-05-18 LAB
ALBUMIN SERPL ELPH-MCNC: 3.1 G/DL — LOW (ref 3.3–5)
ALP SERPL-CCNC: 100 U/L — SIGNIFICANT CHANGE UP (ref 40–120)
ALT FLD-CCNC: 71 U/L — SIGNIFICANT CHANGE UP (ref 12–78)
ANION GAP SERPL CALC-SCNC: 14 MMOL/L — SIGNIFICANT CHANGE UP (ref 5–17)
AST SERPL-CCNC: 66 U/L — HIGH (ref 15–37)
BASOPHILS # BLD AUTO: 0 K/UL — SIGNIFICANT CHANGE UP (ref 0–0.2)
BASOPHILS NFR BLD AUTO: 0 % — SIGNIFICANT CHANGE UP (ref 0–2)
BILIRUB SERPL-MCNC: 0.9 MG/DL — SIGNIFICANT CHANGE UP (ref 0.2–1.2)
BUN SERPL-MCNC: 6 MG/DL — LOW (ref 7–23)
CALCIUM SERPL-MCNC: 9.5 MG/DL — SIGNIFICANT CHANGE UP (ref 8.5–10.1)
CHLORIDE SERPL-SCNC: 94 MMOL/L — LOW (ref 96–108)
CO2 SERPL-SCNC: 27 MMOL/L — SIGNIFICANT CHANGE UP (ref 22–31)
CREAT SERPL-MCNC: 0.56 MG/DL — SIGNIFICANT CHANGE UP (ref 0.5–1.3)
EGFR: 137 ML/MIN/1.73M2 — SIGNIFICANT CHANGE UP
EOSINOPHIL # BLD AUTO: 0.06 K/UL — SIGNIFICANT CHANGE UP (ref 0–0.5)
EOSINOPHIL NFR BLD AUTO: 1 % — SIGNIFICANT CHANGE UP (ref 0–6)
GLUCOSE SERPL-MCNC: 234 MG/DL — HIGH (ref 70–99)
HCT VFR BLD CALC: 42 % — SIGNIFICANT CHANGE UP (ref 39–50)
HGB BLD-MCNC: 14.6 G/DL — SIGNIFICANT CHANGE UP (ref 13–17)
LIDOCAIN IGE QN: 890 U/L — HIGH (ref 73–393)
LYMPHOCYTES # BLD AUTO: 1.06 K/UL — SIGNIFICANT CHANGE UP (ref 1–3.3)
LYMPHOCYTES # BLD AUTO: 17 % — SIGNIFICANT CHANGE UP (ref 13–44)
MAGNESIUM SERPL-MCNC: 2.3 MG/DL — SIGNIFICANT CHANGE UP (ref 1.6–2.6)
MANUAL SMEAR VERIFICATION: SIGNIFICANT CHANGE UP
MCHC RBC-ENTMCNC: 34.7 PG — HIGH (ref 27–34)
MCHC RBC-ENTMCNC: 34.8 G/DL — SIGNIFICANT CHANGE UP (ref 32–36)
MCV RBC AUTO: 99.8 FL — SIGNIFICANT CHANGE UP (ref 80–100)
MONOCYTES # BLD AUTO: 0.93 K/UL — HIGH (ref 0–0.9)
MONOCYTES NFR BLD AUTO: 15 % — HIGH (ref 2–14)
NEUTROPHILS # BLD AUTO: 4.17 K/UL — SIGNIFICANT CHANGE UP (ref 1.8–7.4)
NEUTROPHILS NFR BLD AUTO: 67 % — SIGNIFICANT CHANGE UP (ref 43–77)
NRBC # BLD: 0 /100 — SIGNIFICANT CHANGE UP (ref 0–0)
NRBC # BLD: SIGNIFICANT CHANGE UP /100 WBCS (ref 0–0)
PHOSPHATE SERPL-MCNC: 3.2 MG/DL — SIGNIFICANT CHANGE UP (ref 2.5–4.5)
PLAT MORPH BLD: NORMAL — SIGNIFICANT CHANGE UP
PLATELET # BLD AUTO: 259 K/UL — SIGNIFICANT CHANGE UP (ref 150–400)
POTASSIUM SERPL-MCNC: 3.3 MMOL/L — LOW (ref 3.5–5.3)
POTASSIUM SERPL-SCNC: 3.3 MMOL/L — LOW (ref 3.5–5.3)
PROT SERPL-MCNC: 6.8 GM/DL — SIGNIFICANT CHANGE UP (ref 6–8.3)
RBC # BLD: 4.21 M/UL — SIGNIFICANT CHANGE UP (ref 4.2–5.8)
RBC # FLD: 11.9 % — SIGNIFICANT CHANGE UP (ref 10.3–14.5)
RBC BLD AUTO: NORMAL — SIGNIFICANT CHANGE UP
SODIUM SERPL-SCNC: 135 MMOL/L — SIGNIFICANT CHANGE UP (ref 135–145)
WBC # BLD: 6.22 K/UL — SIGNIFICANT CHANGE UP (ref 3.8–10.5)
WBC # FLD AUTO: 6.22 K/UL — SIGNIFICANT CHANGE UP (ref 3.8–10.5)

## 2022-05-18 PROCEDURE — 99239 HOSP IP/OBS DSCHRG MGMT >30: CPT

## 2022-05-18 RX ORDER — POTASSIUM CHLORIDE 20 MEQ
40 PACKET (EA) ORAL ONCE
Refills: 0 | Status: COMPLETED | OUTPATIENT
Start: 2022-05-18 | End: 2022-05-18

## 2022-05-18 RX ADMIN — HEPARIN SODIUM 5000 UNIT(S): 5000 INJECTION INTRAVENOUS; SUBCUTANEOUS at 14:49

## 2022-05-18 RX ADMIN — Medication 2: at 17:30

## 2022-05-18 RX ADMIN — Medication 40 MILLIEQUIVALENT(S): at 14:49

## 2022-05-18 RX ADMIN — Medication 105 MILLIGRAM(S): at 11:51

## 2022-05-18 RX ADMIN — Medication 1 MILLIGRAM(S): at 11:45

## 2022-05-18 RX ADMIN — Medication 2: at 08:41

## 2022-05-18 RX ADMIN — Medication 8: at 11:50

## 2022-05-18 RX ADMIN — CHLORHEXIDINE GLUCONATE 1 APPLICATION(S): 213 SOLUTION TOPICAL at 06:46

## 2022-05-18 RX ADMIN — HEPARIN SODIUM 5000 UNIT(S): 5000 INJECTION INTRAVENOUS; SUBCUTANEOUS at 06:43

## 2022-05-18 RX ADMIN — LISINOPRIL 40 MILLIGRAM(S): 2.5 TABLET ORAL at 06:42

## 2022-05-18 NOTE — DISCHARGE NOTE PROVIDER - HOSPITAL COURSE
30yo M with PMH of type 1 DM (prior episode of DKA in Feb '22), HTN, HLD, EtOH abuse who presented to [facility] on 5/15 with nausea, NBNB vomiting, abdominal pain and chest pain, stating he feels similar to last DKA episode. Pt had reported compliance with insulin regimen, but says he had been "eating too much" and drinking alcohol more frequently. In ED, pt found to have serum glucose 644, pH 7.2, Bicarb 7, AG 36, Cr 1.5, lactate 11, leukocytosis of 17k and lipase of 1866. CT AP showed Distal esophageal thickening and Severe hepatic steatosis. Patient was admitted to ICU for DKA, LALO, lactic acidosis and esophagitis, placed on insulin drip with fluid resuscitation.     Following admission, patients Anion gap closed and he was titrated off of insulin drip and started on ISS with D5 1/2 NS in addition to PO diet. His lipase levels have continued to down trend and he reports symptomatic improvement of N/V and abdominal/chest pain. Per GI plan to continue to trend lipase and LFTs, progress diet as tolerated. He remains afebrile, with downtrending leukocytosis off of antibiotics. In addition, pt was initially started on CIWA protocol due to hx of etoh use, but had not required any doses of ativan and is not showing active signs of withdrawals.     DX: DKA, esophagitis, LALO, dehydration, transaminitis, abdominal pain, pancreatitis, HTN    NEURO  awake, alert, oriented no acute issues  MVI/thiamine/folic acid  monitor on CIWA for signs of ETOH withdrawal  CIWA = 0  benzo as needed  currently calm and cooperative  discussed with patient need for alcohol cessation    CV  HTN: cont lisinopril  chest pain likely underlying esophagitis which may be from ETOH use  troponin negative    PULM  no acute issues    ENDO  DKA  off insulin    blood sugar now improved from the 90s to 200s  tolerating full diet    has insulin and supplies at home       GI  esophagitis and pancreatitis  ? alcohol related  elevated liver enzymes may also be in setting of alcohol intake/use  patient tolerating full diet   d/c IVF  abdominal pain improved (abdominal pain from DKA vs pancreatitis)  appreciate GI eval: cont supportive care      RENAL  LALO in setting of dehydration with DKA and osmotic diuresis  s/p IVF hydration  Cr improved  monitor electrolytes  supplement hypophosphatemia, and hypokalemia    ID  leukocytosis in setting of DKA and pancreatitis  observe off antibx  cultures negative to date  WBC improving            pt seen and examined 45 min spent on dc planning     Lab test review, Radiology Review, Vitals review, Consultant review and discussion, Physical examination, IDR, Assessment and plan; Plan discussion with patient and family

## 2022-05-18 NOTE — PROGRESS NOTE ADULT - SUBJECTIVE AND OBJECTIVE BOX
Patient is a 31y old  Male who presents with a chief complaint of DKA (18 May 2022 12:22)      HPI:  32yo M with PMH of type 1 DM (prior episode of DKA in Feb '22), HTN, HLD, EtOH abuse presents to ED with nausea, NBNB vomiting, abdominal pain and chest pain since this morning. Pt states symptoms are similar to last DKA episode; states he has been taking his insulin as directed but says "I eat too much". Describes CP as 10/10, sudden onset, substernal, nonradiating; endorses SOB 2/2 pain. No numbness/tingling in extremities. Abdominal pain is generalized, nonradiating. Endorses multiple episodes of NBNB vomiting. Denies fevers, changes in bowel habits, dysuria, urinary frequency, hematuria. No recent travel, no sick contacts. Pt smokes marijuana. Drinks alcohol "hard liquor 3-4 times per week"; states he has experienced withdrawal symptoms in the past when trying to stop but no hospitalization, no seizures. Pt's mother states pt has been depressed lately and is drinking alcohol more frequently.   In ED pt found to have serum glucose 644, pH 7.2, bicarb 7, AG 36, Cr 1.5, LA 11, WBC 17k. ICu consulted for management of DKA.  (15 May 2022 18:52)      INTERVAL HPI/OVERNIGHT EVENTS:  Patient's abdominal / chest pain continues to improve. He only gets it when he eats. It is getting better every day. The patient denies melena, hematochezia, hematemesis, nausea, vomiting, constipation, diarrhea, or change in bowel movements      MEDICATIONS  (STANDING):  chlorhexidine 2% Cloths 1 Application(s) Topical <User Schedule>  dextrose 5%. 1000 milliLiter(s) (100 mL/Hr) IV Continuous <Continuous>  dextrose 5%. 1000 milliLiter(s) (50 mL/Hr) IV Continuous <Continuous>  dextrose 5%. 1000 milliLiter(s) (100 mL/Hr) IV Continuous <Continuous>  dextrose 5%. 1000 milliLiter(s) (50 mL/Hr) IV Continuous <Continuous>  dextrose 50% Injectable 25 Gram(s) IV Push once  dextrose 50% Injectable 12.5 Gram(s) IV Push once  dextrose 50% Injectable 25 Gram(s) IV Push once  folic acid 1 milliGRAM(s) Oral daily  glucagon  Injectable 1 milliGRAM(s) IntraMuscular once  glucagon  Injectable 1 milliGRAM(s) IntraMuscular once  heparin   Injectable 5000 Unit(s) SubCutaneous every 8 hours  insulin glargine Injectable (LANTUS) 10 Unit(s) SubCutaneous at bedtime  insulin lispro (ADMELOG) corrective regimen sliding scale   SubCutaneous three times a day before meals  insulin lispro (ADMELOG) corrective regimen sliding scale   SubCutaneous at bedtime  lisinopril 40 milliGRAM(s) Oral daily  potassium chloride    Tablet ER 40 milliEquivalent(s) Oral once    MEDICATIONS  (PRN):  dextrose Oral Gel 15 Gram(s) Oral once PRN Blood Glucose LESS THAN 70 milliGRAM(s)/deciliter  LORazepam   Injectable 2 milliGRAM(s) IV Push every 1 hour PRN CIWA-Ar score 8 or greater  morphine  - Injectable 2 milliGRAM(s) IV Push every 4 hours PRN Severe Pain (7 - 10)      FAMILY HISTORY:      Allergies    No Known Allergies    Intolerances        PMH/PSH:  DM (diabetes mellitus), type 1          REVIEW OF SYSTEMS:  CONSTITUTIONAL: No fever, weight loss,  EYES: No eye pain, visual disturbances, or discharge  ENMT:  No difficulty hearing, tinnitus, vertigo; No sinus or throat pain  NECK: No pain or stiffness  BREASTS: No pain, masses, or nipple discharge  RESPIRATORY: No cough, wheezing, chills or hemoptysis; No shortness of breath  CARDIOVASCULAR: No chest pain, palpitations, dizziness, or leg swelling  GASTROINTESTINAL:  see above   GENITOURINARY: No dysuria, frequency, hematuria, or incontinence  NEUROLOGICAL: No headaches, memory loss, loss of strength, numbness, or tremors  SKIN: No itching, burning, rashes, or lesions   LYMPH NODES: No enlarged glands  ENDOCRINE: No heat or cold intolerance; No hair loss  MUSCULOSKELETAL: No joint pain or swelling; No muscle, back, or extremity pain  PSYCHIATRIC: No depression, anxiety, mood swings, or difficulty sleeping  HEME/LYMPH: No easy bruising, or bleeding gums  ALLERGY AND IMMUNOLOGIC: No hives or eczema    Vital Signs Last 24 Hrs  T(C): 36.7 (18 May 2022 11:01), Max: 37.4 (18 May 2022 05:09)  T(F): 98 (18 May 2022 11:01), Max: 99.3 (18 May 2022 05:09)  HR: 116 (18 May 2022 11:01) (102 - 116)  BP: 116/76 (18 May 2022 11:01) (116/76 - 155/96)  BP(mean): 106 (17 May 2022 16:00) (106 - 109)  RR: 17 (18 May 2022 11:01) (16 - 21)  SpO2: 97% (18 May 2022 11:01) (97% - 99%)    PHYSICAL EXAM:  GENERAL: NAD, well-groomed, well-developed  HEAD:  Atraumatic, Normocephalic  EYES: EOMI, PERRLA, conjunctiva and sclera clear  NECK: Supple, No JVD, Normal thyroid  NERVOUS SYSTEM:  Alert & Oriented X3, Good concentration; Motor Strength 5/5 B/L upper and lower extremities;  CHEST/LUNG: Clear to percussion bilaterally; No rales, rhonchi, wheezing, or rubs  HEART: Regular rate and rhythm; No murmurs, rubs, or gallops  ABDOMEN: Soft, Nontender, Nondistended; Bowel sounds present  EXTREMITIES:  2+ Peripheral Pulses, No clubbing, cyanosis, or edema  LYMPH: No lymphadenopathy noted  SKIN: No rashes or lesions    LAB  05-18 @ 08:39  amylase --   lipase 890   05-17 @ 02:16  amylase --   lipase 746   05-16 @ 04:19  amylase --   lipase 1866   05-15 @ 17:33  amylase --   lipase 613                           14.6   6.22  )-----------( 259      ( 18 May 2022 08:39 )             42.0       CBC:  05-18 @ 08:39  WBC 6.22   Hgb 14.6   Hct 42.0   Plts 259  MCV 99.8  05-17 @ 02:16  WBC 10.85   Hgb 13.8   Hct 39.6   Plts 211  MCV 99.5  05-16 @ 04:19  WBC 11.84   Hgb 13.2   Hct 37.4   Plts 225  .3  05-15 @ 17:33  WBC 17.92   Hgb 15.6   Hct 47.6   Plts 298  .5      Chemistry:  05-18 @ 08:39  Na+ 135  K+ 3.3  Cl- 94  CO2 27  BUN 6  Cr 0.56     05-17 @ 02:16  Na+ 136  K+ 3.4  Cl- 100  CO2 26  BUN 3  Cr 0.49     05-16 @ 04:19  Na+ 135  K+ 3.6  Cl- 100  CO2 24  BUN 7  Cr 0.76     05-15 @ 21:49  Na+ 133  K+ 4.6  Cl- 98  CO2 16  BUN 10  Cr 1.03     05-15 @ 17:33  Na+ 131  K+ 5.2  Cl- 84  CO2 11  BUN 12  Cr 1.54         Glucose, Serum: 234 mg/dL (05-18 @ 08:39)  Glucose, Serum: 205 mg/dL (05-17 @ 02:16)  Glucose, Serum: 127 mg/dL (05-16 @ 04:19)  Glucose, Serum: 257 mg/dL (05-15 @ 21:49)  Glucose, Serum: 644 mg/dL (05-15 @ 17:33)      18 May 2022 08:39    135    |  94     |  6      ----------------------------<  234    3.3     |  27     |  0.56   17 May 2022 02:16    136    |  100    |  3      ----------------------------<  205    3.4     |  26     |  0.49   16 May 2022 04:19    135    |  100    |  7      ----------------------------<  127    3.6     |  24     |  0.76   15 May 2022 21:49    133    |  98     |  10     ----------------------------<  257    4.6     |  16     |  1.03   15 May 2022 17:33    131    |  84     |  12     ----------------------------<  644    5.2     |  11     |  1.54     Ca    9.5        18 May 2022 08:39  Ca    9.3        17 May 2022 02:16  Ca    9.1        16 May 2022 04:19  Ca    8.9        15 May 2022 21:49  Ca    9.5        15 May 2022 17:33  Phos  3.2       18 May 2022 08:39  Phos  1.9       17 May 2022 02:16  Phos  1.4       16 May 2022 04:19  Phos  1.1       15 May 2022 21:49  Mg     2.3       18 May 2022 08:39  Mg     1.9       17 May 2022 02:16  Mg     2.3       16 May 2022 04:19  Mg     1.7       15 May 2022 21:49  Mg     2.1       15 May 2022 17:33    TPro  6.8    /  Alb  3.1    /  TBili  0.9    /  DBili  x      /  AST  66     /  ALT  71     /  AlkPhos  100    18 May 2022 08:39  TPro  6.8    /  Alb  3.3    /  TBili  0.6    /  DBili  0.2    /  AST  67     /  ALT  93     /  AlkPhos  103    17 May 2022 02:16  TPro  6.8    /  Alb  3.2    /  TBili  0.6    /  DBili  x      /  AST  73     /  ALT  120    /  AlkPhos  97     16 May 2022 04:19  TPro  9.0    /  Alb  4.3    /  TBili  1.1    /  DBili  x      /  AST  126    /  ALT  184    /  AlkPhos  139    15 May 2022 17:33              CAPILLARY BLOOD GLUCOSE      POCT Blood Glucose.: 332 mg/dL (18 May 2022 11:48)  POCT Blood Glucose.: 313 mg/dL (18 May 2022 11:32)  POCT Blood Glucose.: 200 mg/dL (17 May 2022 21:32)  POCT Blood Glucose.: 246 mg/dL (17 May 2022 16:17)      C-Reactive Protein, Serum: 14 mg/L (05-16 @ 09:10)      RADIOLOGY & ADDITIONAL TESTS:    Imaging Personally Reviewed:  [ ] YES  [ ] NO    Consultant(s) Notes Reviewed:  [ ] YES  [ ] NO    Care Discussed with Consultants/Other Providers [ ] YES  [ ] NO Patient is a 31y old  Male who presents with a chief complaint of DKA (18 May 2022 12:22)      HPI:  30yo M with PMH of type 1 DM (prior episode of DKA in Feb '22), HTN, HLD, EtOH abuse presents to ED with nausea, NBNB vomiting, abdominal pain and chest pain since this morning. Pt states symptoms are similar to last DKA episode; states he has been taking his insulin as directed but says "I eat too much". Describes CP as 10/10, sudden onset, substernal, nonradiating; endorses SOB 2/2 pain. No numbness/tingling in extremities. Abdominal pain is generalized, nonradiating. Endorses multiple episodes of NBNB vomiting. Denies fevers, changes in bowel habits, dysuria, urinary frequency, hematuria. No recent travel, no sick contacts. Pt smokes marijuana. Drinks alcohol "hard liquor 3-4 times per week"; states he has experienced withdrawal symptoms in the past when trying to stop but no hospitalization, no seizures. Pt's mother states pt has been depressed lately and is drinking alcohol more frequently.   In ED pt found to have serum glucose 644, pH 7.2, bicarb 7, AG 36, Cr 1.5, LA 11, WBC 17k. ICu consulted for management of DKA.  (15 May 2022 18:52)      INTERVAL HPI/OVERNIGHT EVENTS:  Patient's abdominal / chest pain continues to improve. He only gets it when he eats. It is getting better every day. The patient denies melena, hematochezia, hematemesis, nausea, vomiting, constipation, diarrhea, or change in bowel movements  On a solid diet    MEDICATIONS  (STANDING):  chlorhexidine 2% Cloths 1 Application(s) Topical <User Schedule>  dextrose 5%. 1000 milliLiter(s) (100 mL/Hr) IV Continuous <Continuous>  dextrose 5%. 1000 milliLiter(s) (50 mL/Hr) IV Continuous <Continuous>  dextrose 5%. 1000 milliLiter(s) (100 mL/Hr) IV Continuous <Continuous>  dextrose 5%. 1000 milliLiter(s) (50 mL/Hr) IV Continuous <Continuous>  dextrose 50% Injectable 25 Gram(s) IV Push once  dextrose 50% Injectable 12.5 Gram(s) IV Push once  dextrose 50% Injectable 25 Gram(s) IV Push once  folic acid 1 milliGRAM(s) Oral daily  glucagon  Injectable 1 milliGRAM(s) IntraMuscular once  glucagon  Injectable 1 milliGRAM(s) IntraMuscular once  heparin   Injectable 5000 Unit(s) SubCutaneous every 8 hours  insulin glargine Injectable (LANTUS) 10 Unit(s) SubCutaneous at bedtime  insulin lispro (ADMELOG) corrective regimen sliding scale   SubCutaneous three times a day before meals  insulin lispro (ADMELOG) corrective regimen sliding scale   SubCutaneous at bedtime  lisinopril 40 milliGRAM(s) Oral daily  potassium chloride    Tablet ER 40 milliEquivalent(s) Oral once    MEDICATIONS  (PRN):  dextrose Oral Gel 15 Gram(s) Oral once PRN Blood Glucose LESS THAN 70 milliGRAM(s)/deciliter  LORazepam   Injectable 2 milliGRAM(s) IV Push every 1 hour PRN CIWA-Ar score 8 or greater  morphine  - Injectable 2 milliGRAM(s) IV Push every 4 hours PRN Severe Pain (7 - 10)      FAMILY HISTORY:      Allergies    No Known Allergies    Intolerances        PMH/PSH:  DM (diabetes mellitus), type 1          REVIEW OF SYSTEMS:  CONSTITUTIONAL: No fever, weight loss,  EYES: No eye pain, visual disturbances, or discharge  ENMT:  No difficulty hearing, tinnitus, vertigo; No sinus or throat pain  NECK: No pain or stiffness  BREASTS: No pain, masses, or nipple discharge  RESPIRATORY: No cough, wheezing, chills or hemoptysis; No shortness of breath  CARDIOVASCULAR: No chest pain, palpitations, dizziness, or leg swelling  GASTROINTESTINAL:  see above   GENITOURINARY: No dysuria, frequency, hematuria, or incontinence  NEUROLOGICAL: No headaches, memory loss, loss of strength, numbness, or tremors  SKIN: No itching, burning, rashes, or lesions   LYMPH NODES: No enlarged glands  ENDOCRINE: No heat or cold intolerance; No hair loss  MUSCULOSKELETAL: No joint pain or swelling; No muscle, back, or extremity pain  PSYCHIATRIC: No depression, anxiety, mood swings, or difficulty sleeping  HEME/LYMPH: No easy bruising, or bleeding gums  ALLERGY AND IMMUNOLOGIC: No hives or eczema    Vital Signs Last 24 Hrs  T(C): 36.7 (18 May 2022 11:01), Max: 37.4 (18 May 2022 05:09)  T(F): 98 (18 May 2022 11:01), Max: 99.3 (18 May 2022 05:09)  HR: 116 (18 May 2022 11:01) (102 - 116)  BP: 116/76 (18 May 2022 11:01) (116/76 - 155/96)  BP(mean): 106 (17 May 2022 16:00) (106 - 109)  RR: 17 (18 May 2022 11:01) (16 - 21)  SpO2: 97% (18 May 2022 11:01) (97% - 99%)    PHYSICAL EXAM:  GENERAL: NAD, well-groomed, well-developed  HEAD:  Atraumatic, Normocephalic  EYES: EOMI, PERRLA, conjunctiva and sclera clear  NECK: Supple, No JVD, Normal thyroid  NERVOUS SYSTEM:  Alert & Oriented X3, Good concentration; Motor Strength 5/5 B/L upper and lower extremities;  CHEST/LUNG: Clear to percussion bilaterally; No rales, rhonchi, wheezing, or rubs  HEART: Regular rate and rhythm; No murmurs, rubs, or gallops  ABDOMEN: Soft, Nontender, Nondistended; Bowel sounds present  EXTREMITIES:  2+ Peripheral Pulses, No clubbing, cyanosis, or edema  LYMPH: No lymphadenopathy noted  SKIN: No rashes or lesions    LAB  05-18 @ 08:39  amylase --   lipase 890   05-17 @ 02:16  amylase --   lipase 746   05-16 @ 04:19  amylase --   lipase 1866   05-15 @ 17:33  amylase --   lipase 613                           14.6   6.22  )-----------( 259      ( 18 May 2022 08:39 )             42.0       CBC:  05-18 @ 08:39  WBC 6.22   Hgb 14.6   Hct 42.0   Plts 259  MCV 99.8  05-17 @ 02:16  WBC 10.85   Hgb 13.8   Hct 39.6   Plts 211  MCV 99.5  05-16 @ 04:19  WBC 11.84   Hgb 13.2   Hct 37.4   Plts 225  .3  05-15 @ 17:33  WBC 17.92   Hgb 15.6   Hct 47.6   Plts 298  .5      Chemistry:  05-18 @ 08:39  Na+ 135  K+ 3.3  Cl- 94  CO2 27  BUN 6  Cr 0.56     05-17 @ 02:16  Na+ 136  K+ 3.4  Cl- 100  CO2 26  BUN 3  Cr 0.49     05-16 @ 04:19  Na+ 135  K+ 3.6  Cl- 100  CO2 24  BUN 7  Cr 0.76     05-15 @ 21:49  Na+ 133  K+ 4.6  Cl- 98  CO2 16  BUN 10  Cr 1.03     05-15 @ 17:33  Na+ 131  K+ 5.2  Cl- 84  CO2 11  BUN 12  Cr 1.54         Glucose, Serum: 234 mg/dL (05-18 @ 08:39)  Glucose, Serum: 205 mg/dL (05-17 @ 02:16)  Glucose, Serum: 127 mg/dL (05-16 @ 04:19)  Glucose, Serum: 257 mg/dL (05-15 @ 21:49)  Glucose, Serum: 644 mg/dL (05-15 @ 17:33)      18 May 2022 08:39    135    |  94     |  6      ----------------------------<  234    3.3     |  27     |  0.56   17 May 2022 02:16    136    |  100    |  3      ----------------------------<  205    3.4     |  26     |  0.49   16 May 2022 04:19    135    |  100    |  7      ----------------------------<  127    3.6     |  24     |  0.76   15 May 2022 21:49    133    |  98     |  10     ----------------------------<  257    4.6     |  16     |  1.03   15 May 2022 17:33    131    |  84     |  12     ----------------------------<  644    5.2     |  11     |  1.54     Ca    9.5        18 May 2022 08:39  Ca    9.3        17 May 2022 02:16  Ca    9.1        16 May 2022 04:19  Ca    8.9        15 May 2022 21:49  Ca    9.5        15 May 2022 17:33  Phos  3.2       18 May 2022 08:39  Phos  1.9       17 May 2022 02:16  Phos  1.4       16 May 2022 04:19  Phos  1.1       15 May 2022 21:49  Mg     2.3       18 May 2022 08:39  Mg     1.9       17 May 2022 02:16  Mg     2.3       16 May 2022 04:19  Mg     1.7       15 May 2022 21:49  Mg     2.1       15 May 2022 17:33    TPro  6.8    /  Alb  3.1    /  TBili  0.9    /  DBili  x      /  AST  66     /  ALT  71     /  AlkPhos  100    18 May 2022 08:39  TPro  6.8    /  Alb  3.3    /  TBili  0.6    /  DBili  0.2    /  AST  67     /  ALT  93     /  AlkPhos  103    17 May 2022 02:16  TPro  6.8    /  Alb  3.2    /  TBili  0.6    /  DBili  x      /  AST  73     /  ALT  120    /  AlkPhos  97     16 May 2022 04:19  TPro  9.0    /  Alb  4.3    /  TBili  1.1    /  DBili  x      /  AST  126    /  ALT  184    /  AlkPhos  139    15 May 2022 17:33              CAPILLARY BLOOD GLUCOSE      POCT Blood Glucose.: 332 mg/dL (18 May 2022 11:48)  POCT Blood Glucose.: 313 mg/dL (18 May 2022 11:32)  POCT Blood Glucose.: 200 mg/dL (17 May 2022 21:32)  POCT Blood Glucose.: 246 mg/dL (17 May 2022 16:17)      C-Reactive Protein, Serum: 14 mg/L (05-16 @ 09:10)      RADIOLOGY & ADDITIONAL TESTS:    Imaging Personally Reviewed:  [ ] YES  [ ] NO    Consultant(s) Notes Reviewed:  [ ] YES  [ ] NO    Care Discussed with Consultants/Other Providers [ ] YES  [ ] NO

## 2022-05-18 NOTE — DISCHARGE NOTE PROVIDER - NSDCCPCAREPLAN_GEN_ALL_CORE_FT
PRINCIPAL DISCHARGE DIAGNOSIS  Diagnosis: DKA, type 1  Assessment and Plan of Treatment: continue your insulin regiment  monitor blood glucose  do not drink alcohol  follow up with your primary care doctor within 7days

## 2022-05-18 NOTE — PROGRESS NOTE ADULT - ASSESSMENT
HPI:  30yo M with PMH of type 1 DM (prior episode of DKA in Feb '22), HTN, HLD, EtOH abuse presents to ED with nausea, NBNB vomiting, abdominal pain and chest pain since this morning. Pt states symptoms are similar to last DKA episode; states he has been taking his insulin as directed but says "I eat too much". Describes CP as 10/10, sudden onset, substernal, nonradiating; endorses SOB 2/2 pain. No numbness/tingling in extremities. Abdominal pain is generalized, nonradiating. Endorses multiple episodes of NBNB vomiting. Denies fevers, changes in bowel habits, dysuria, urinary frequency, hematuria. No recent travel, no sick contacts. Pt smokes marijuana. Drinks alcohol "hard liquor 3-4 times per week"; states he has experienced withdrawal symptoms in the past when trying to stop but no hospitalization, no seizures. Pt's mother states pt has been depressed lately and is drinking alcohol more frequently.   In ED pt found to have serum glucose 644, pH 7.2, bicarb 7, AG 36, Cr 1.5, LA 11, WBC 17k. ICu consulted for management of DKA.  (15 May 2022 18:52)  ----- As Above ------   Patient presented with a mid quadrant pain best described as dull / pressure like. Associated with N/V. Started the day prior to admission. He states that it is similar to the pain he had on admission when diagnsed with DKA. Patient was also diagnosed with ETOH pancreatitis in the past but it was a different type of pain. Denies any recent NSAIDs. Last ETOH was Saturday ( today is Monday ) History of ETOH abuse.   CT scan shows an essentially normal pancreas. Lipase 613 --> 1866   Patient feeling better today. ( pain better, No N/V ). No appetite but would not mind clear liquids.     A) Elevated Lipase - 613 --> 1866 --> 746  Essentially normal pancreas by CT scan, different pain than previous pancreatitis - Elevation probably related to DKA rather than from the pancreas.  1) Supportive care for now ( f/u labs ) 2) Slowly advance diet as tolerated  B) Elevated transaminases - probably related to DKA -  Improving 1.1/126/184/139 --> 0.6/73/120/97 - 0.6/67/93/103 supportive care for now . Additional blood work ordered  C) Esophagitis - Patient without any symptoms - Supportive care for now HPI:  30yo M with PMH of type 1 DM (prior episode of DKA in Feb '22), HTN, HLD, EtOH abuse presents to ED with nausea, NBNB vomiting, abdominal pain and chest pain since this morning. Pt states symptoms are similar to last DKA episode; states he has been taking his insulin as directed but says "I eat too much". Describes CP as 10/10, sudden onset, substernal, nonradiating; endorses SOB 2/2 pain. No numbness/tingling in extremities. Abdominal pain is generalized, nonradiating. Endorses multiple episodes of NBNB vomiting. Denies fevers, changes in bowel habits, dysuria, urinary frequency, hematuria. No recent travel, no sick contacts. Pt smokes marijuana. Drinks alcohol "hard liquor 3-4 times per week"; states he has experienced withdrawal symptoms in the past when trying to stop but no hospitalization, no seizures. Pt's mother states pt has been depressed lately and is drinking alcohol more frequently.   In ED pt found to have serum glucose 644, pH 7.2, bicarb 7, AG 36, Cr 1.5, LA 11, WBC 17k. ICu consulted for management of DKA.  (15 May 2022 18:52)  ----- As Above ------   Patient presented with a mid quadrant pain best described as dull / pressure like. Associated with N/V. Started the day prior to admission. He states that it is similar to the pain he had on admission when diagnsed with DKA. Patient was also diagnosed with ETOH pancreatitis in the past but it was a different type of pain. Denies any recent NSAIDs. Last ETOH was Saturday ( today is Monday ) History of ETOH abuse.   CT scan shows an essentially normal pancreas. Lipase 613 --> 1866   Patient feeling better today. ( pain better, No N/V ). No appetite but would not mind clear liquids.     A) Elevated Lipase - 613 --> 1866 --> 746 --> 890 Essentially normal pancreas by CT scan, different pain than previous pancreatitis - Elevation probably related to DKA rather than from the pancreas.  1) Supportive care for now ( f/u labs )   B) Elevated transaminases - probably related to DKA -  Improving 1.1/126/184/139 --> 0.6/73/120/97 - 0.6/67/93/103 --> 0.9/66/71/100  supportive care for now . Additional blood work ordered  C) Esophagitis - Patient without any symptoms - Supportive care for now HPI:  32yo M with PMH of type 1 DM (prior episode of DKA in Feb '22), HTN, HLD, EtOH abuse presents to ED with nausea, NBNB vomiting, abdominal pain and chest pain since this morning. Pt states symptoms are similar to last DKA episode; states he has been taking his insulin as directed but says "I eat too much". Describes CP as 10/10, sudden onset, substernal, nonradiating; endorses SOB 2/2 pain. No numbness/tingling in extremities. Abdominal pain is generalized, nonradiating. Endorses multiple episodes of NBNB vomiting. Denies fevers, changes in bowel habits, dysuria, urinary frequency, hematuria. No recent travel, no sick contacts. Pt smokes marijuana. Drinks alcohol "hard liquor 3-4 times per week"; states he has experienced withdrawal symptoms in the past when trying to stop but no hospitalization, no seizures. Pt's mother states pt has been depressed lately and is drinking alcohol more frequently.   In ED pt found to have serum glucose 644, pH 7.2, bicarb 7, AG 36, Cr 1.5, LA 11, WBC 17k. ICu consulted for management of DKA.  (15 May 2022 18:52)  ----- As Above ------   Patient presented with a mid quadrant pain best described as dull / pressure like. Associated with N/V. Started the day prior to admission. He states that it is similar to the pain he had on admission when diagnsed with DKA. Patient was also diagnosed with ETOH pancreatitis in the past but it was a different type of pain. Denies any recent NSAIDs. Last ETOH was Saturday ( today is Monday ) History of ETOH abuse.   CT scan shows an essentially normal pancreas. Lipase 613 --> 1866   Patient feeling better today. ( pain better, No N/V ). No appetite but would not mind clear liquids.     A) Elevated Lipase - 613 --> 1866 --> 746 --> 890 Essentially normal pancreas by CT scan, different pain than previous pancreatitis - Elevation probably related to DKA rather than from the pancreas.  1) Supportive care for now ( f/u labs )   B) Elevated transaminases - probably related to DKA -  Improving 1.1/126/184/139 --> 0.6/73/120/97 - 0.6/67/93/103 --> 0.9/66/71/100  Viral studies negative. Supportive care for now   C) Esophagitis - Patient's chest pain improving - Recommend EGD  D) Abdominal pain - Improving - Recommend EGD  ===== Recommended to patient to have EGD tomorrow Patient adamantly refused and wants to go home now. Since patient; s symptoms are improving, and he is tolerating a solid diet,  it is a reasonable alternative that he has his EGD as an outpatient. Recommended that he see me in follow up. Patient wants to think about it. He will call my office if he so chooses to.

## 2022-05-18 NOTE — DISCHARGE NOTE PROVIDER - NSDCMRMEDTOKEN_GEN_ALL_CORE_FT
famotidine 20 mg oral tablet: 1 tab(s) orally 2 times a day  NovoLOG 100 units/mL subcutaneous solution:  subcutaneous   ramipril 10 mg oral tablet: 1 tab(s) orally once a day  simvastatin 20 mg oral tablet: 1 tab(s) orally once a day (at bedtime)  Tresiba FlexTouch 100 units/mL subcutaneous solution:  subcutaneous

## 2022-05-18 NOTE — DISCHARGE NOTE NURSING/CASE MANAGEMENT/SOCIAL WORK - PATIENT PORTAL LINK FT
You can access the FollowMyHealth Patient Portal offered by [facility] by registering at the following website: [email or web address]. By joining [email or web address] FollowMyHealth portal, you will also be able to view your health information using other applications (apps) compatible with our system.

## 2022-05-20 LAB
CULTURE RESULTS: SIGNIFICANT CHANGE UP
CULTURE RESULTS: SIGNIFICANT CHANGE UP
SPECIMEN SOURCE: SIGNIFICANT CHANGE UP
SPECIMEN SOURCE: SIGNIFICANT CHANGE UP

## 2022-08-08 ENCOUNTER — EMERGENCY (EMERGENCY)
Facility: HOSPITAL | Age: 32
LOS: 0 days | Discharge: HOME | End: 2022-08-09
Attending: EMERGENCY MEDICINE

## 2022-08-08 VITALS
TEMPERATURE: 98 F | HEIGHT: 73 IN | RESPIRATION RATE: 18 BRPM | HEART RATE: 146 BPM | WEIGHT: 169.98 LBS | OXYGEN SATURATION: 97 % | DIASTOLIC BLOOD PRESSURE: 113 MMHG | SYSTOLIC BLOOD PRESSURE: 157 MMHG

## 2022-08-08 DIAGNOSIS — Z20.822 CONTACT WITH AND (SUSPECTED) EXPOSURE TO COVID-19: ICD-10-CM

## 2022-08-08 DIAGNOSIS — F10.929 ALCOHOL USE, UNSPECIFIED WITH INTOXICATION, UNSPECIFIED: ICD-10-CM

## 2022-08-08 DIAGNOSIS — R53.1 WEAKNESS: ICD-10-CM

## 2022-08-08 DIAGNOSIS — R42 DIZZINESS AND GIDDINESS: ICD-10-CM

## 2022-08-08 DIAGNOSIS — F17.200 NICOTINE DEPENDENCE, UNSPECIFIED, UNCOMPLICATED: ICD-10-CM

## 2022-08-08 DIAGNOSIS — Z79.4 LONG TERM (CURRENT) USE OF INSULIN: ICD-10-CM

## 2022-08-08 DIAGNOSIS — R00.0 TACHYCARDIA, UNSPECIFIED: ICD-10-CM

## 2022-08-08 DIAGNOSIS — E10.65 TYPE 1 DIABETES MELLITUS WITH HYPERGLYCEMIA: ICD-10-CM

## 2022-08-08 DIAGNOSIS — R06.02 SHORTNESS OF BREATH: ICD-10-CM

## 2022-08-08 DIAGNOSIS — R10.9 UNSPECIFIED ABDOMINAL PAIN: ICD-10-CM

## 2022-08-08 LAB
ALBUMIN SERPL ELPH-MCNC: 3.5 G/DL — SIGNIFICANT CHANGE UP (ref 3.3–5)
ALP SERPL-CCNC: 125 U/L — HIGH (ref 40–120)
ALT FLD-CCNC: 30 U/L — SIGNIFICANT CHANGE UP (ref 12–78)
ANION GAP SERPL CALC-SCNC: 13 MMOL/L — SIGNIFICANT CHANGE UP (ref 5–17)
APTT BLD: 26.5 SEC — LOW (ref 27.5–35.5)
AST SERPL-CCNC: 73 U/L — HIGH (ref 15–37)
BASE EXCESS BLDV CALC-SCNC: 5.1 MMOL/L — HIGH (ref -2–3)
BASE EXCESS BLDV CALC-SCNC: 8.5 MMOL/L — HIGH (ref -2–3)
BASOPHILS # BLD AUTO: 0.03 K/UL — SIGNIFICANT CHANGE UP (ref 0–0.2)
BASOPHILS NFR BLD AUTO: 0.6 % — SIGNIFICANT CHANGE UP (ref 0–2)
BILIRUB SERPL-MCNC: 0.6 MG/DL — SIGNIFICANT CHANGE UP (ref 0.2–1.2)
BLOOD GAS COMMENTS, VENOUS: SIGNIFICANT CHANGE UP
BLOOD GAS COMMENTS, VENOUS: SIGNIFICANT CHANGE UP
BUN SERPL-MCNC: 4 MG/DL — LOW (ref 7–23)
CALCIUM SERPL-MCNC: 9 MG/DL — SIGNIFICANT CHANGE UP (ref 8.5–10.1)
CHLORIDE BLDV-SCNC: 96 MMOL/L — LOW (ref 98–107)
CHLORIDE SERPL-SCNC: 97 MMOL/L — SIGNIFICANT CHANGE UP (ref 96–108)
CO2 BLDV-SCNC: 32 MMOL/L — HIGH (ref 22–26)
CO2 BLDV-SCNC: 36 MMOL/L — HIGH (ref 22–26)
CO2 SERPL-SCNC: 27 MMOL/L — SIGNIFICANT CHANGE UP (ref 22–31)
CREAT SERPL-MCNC: 0.7 MG/DL — SIGNIFICANT CHANGE UP (ref 0.5–1.3)
D DIMER BLD IA.RAPID-MCNC: 155 NG/ML DDU — SIGNIFICANT CHANGE UP
EGFR: 128 ML/MIN/1.73M2 — SIGNIFICANT CHANGE UP
EOSINOPHIL # BLD AUTO: 0 K/UL — SIGNIFICANT CHANGE UP (ref 0–0.5)
EOSINOPHIL NFR BLD AUTO: 0 % — SIGNIFICANT CHANGE UP (ref 0–6)
ETHANOL SERPL-MCNC: 20 MG/DL — HIGH (ref 0–10)
FLUAV AG NPH QL: SIGNIFICANT CHANGE UP
FLUBV AG NPH QL: SIGNIFICANT CHANGE UP
GAS PNL BLDV: 132 MMOL/L — LOW (ref 136–145)
GAS PNL BLDV: SIGNIFICANT CHANGE UP
GLUCOSE BLDC GLUCOMTR-MCNC: 100 MG/DL — HIGH (ref 70–99)
GLUCOSE BLDV-MCNC: 191 MG/DL — HIGH (ref 65–95)
GLUCOSE SERPL-MCNC: 183 MG/DL — HIGH (ref 70–99)
HCO3 BLDV-SCNC: 30 MMOL/L — HIGH (ref 22–28)
HCO3 BLDV-SCNC: 34 MMOL/L — HIGH (ref 22–28)
HCT VFR BLD CALC: 42.3 % — SIGNIFICANT CHANGE UP (ref 39–50)
HCT VFR BLDA CALC: 51 % — HIGH (ref 37–47)
HGB BLD CALC-MCNC: 17.1 G/DL — SIGNIFICANT CHANGE UP (ref 12.6–17.4)
HGB BLD-MCNC: 15.2 G/DL — SIGNIFICANT CHANGE UP (ref 13–17)
HOROWITZ INDEX BLDV+IHG-RTO: 0.21 — SIGNIFICANT CHANGE UP
HOROWITZ INDEX BLDV+IHG-RTO: 0.21 — SIGNIFICANT CHANGE UP
IMM GRANULOCYTES NFR BLD AUTO: 0.2 % — SIGNIFICANT CHANGE UP (ref 0–1.5)
INR BLD: 0.95 RATIO — SIGNIFICANT CHANGE UP (ref 0.88–1.16)
LACTATE BLDV-MCNC: 3.8 MMOL/L — HIGH (ref 0.56–1.39)
LACTATE SERPL-SCNC: 1.7 MMOL/L — SIGNIFICANT CHANGE UP (ref 0.7–2)
LIDOCAIN IGE QN: 317 U/L — SIGNIFICANT CHANGE UP (ref 73–393)
LYMPHOCYTES # BLD AUTO: 0.61 K/UL — LOW (ref 1–3.3)
LYMPHOCYTES # BLD AUTO: 12.7 % — LOW (ref 13–44)
MAGNESIUM SERPL-MCNC: 1.7 MG/DL — SIGNIFICANT CHANGE UP (ref 1.6–2.6)
MCHC RBC-ENTMCNC: 34.7 PG — HIGH (ref 27–34)
MCHC RBC-ENTMCNC: 35.9 G/DL — SIGNIFICANT CHANGE UP (ref 32–36)
MCV RBC AUTO: 96.6 FL — SIGNIFICANT CHANGE UP (ref 80–100)
MONOCYTES # BLD AUTO: 0.48 K/UL — SIGNIFICANT CHANGE UP (ref 0–0.9)
MONOCYTES NFR BLD AUTO: 10 % — SIGNIFICANT CHANGE UP (ref 2–14)
NEUTROPHILS # BLD AUTO: 3.69 K/UL — SIGNIFICANT CHANGE UP (ref 1.8–7.4)
NEUTROPHILS NFR BLD AUTO: 76.5 % — SIGNIFICANT CHANGE UP (ref 43–77)
NRBC # BLD: 0 /100 WBCS — SIGNIFICANT CHANGE UP (ref 0–0)
NT-PROBNP SERPL-SCNC: 8 PG/ML — SIGNIFICANT CHANGE UP (ref 0–125)
PCO2 BLDV: 46 MMHG — SIGNIFICANT CHANGE UP (ref 42–55)
PCO2 BLDV: 52 MMHG — SIGNIFICANT CHANGE UP (ref 42–55)
PH BLDV: 7.43 — SIGNIFICANT CHANGE UP (ref 7.32–7.43)
PH BLDV: 7.43 — SIGNIFICANT CHANGE UP (ref 7.32–7.43)
PLATELET # BLD AUTO: 309 K/UL — SIGNIFICANT CHANGE UP (ref 150–400)
PO2 BLDV: 45 MMHG — SIGNIFICANT CHANGE UP (ref 25–45)
PO2 BLDV: 70 MMHG — HIGH (ref 25–45)
POTASSIUM BLDV-SCNC: 3.9 MMOL/L — SIGNIFICANT CHANGE UP (ref 3.5–5.1)
POTASSIUM SERPL-MCNC: 3.4 MMOL/L — LOW (ref 3.5–5.3)
POTASSIUM SERPL-SCNC: 3.4 MMOL/L — LOW (ref 3.5–5.3)
PROT SERPL-MCNC: 7.4 GM/DL — SIGNIFICANT CHANGE UP (ref 6–8.3)
PROTHROM AB SERPL-ACNC: 11.3 SEC — SIGNIFICANT CHANGE UP (ref 10.5–13.4)
RBC # BLD: 4.38 M/UL — SIGNIFICANT CHANGE UP (ref 4.2–5.8)
RBC # FLD: 13.2 % — SIGNIFICANT CHANGE UP (ref 10.3–14.5)
SAO2 % BLDV: 73.9 % — LOW (ref 94–98)
SAO2 % BLDV: 95 % — SIGNIFICANT CHANGE UP (ref 94–98)
SARS-COV-2 RNA SPEC QL NAA+PROBE: SIGNIFICANT CHANGE UP
SODIUM SERPL-SCNC: 137 MMOL/L — SIGNIFICANT CHANGE UP (ref 135–145)
TROPONIN I, HIGH SENSITIVITY RESULT: 80.8 NG/L — HIGH
TROPONIN I, HIGH SENSITIVITY RESULT: 83.1 NG/L — HIGH
WBC # BLD: 4.82 K/UL — SIGNIFICANT CHANGE UP (ref 3.8–10.5)
WBC # FLD AUTO: 4.82 K/UL — SIGNIFICANT CHANGE UP (ref 3.8–10.5)

## 2022-08-08 PROCEDURE — 99285 EMERGENCY DEPT VISIT HI MDM: CPT

## 2022-08-08 PROCEDURE — 71045 X-RAY EXAM CHEST 1 VIEW: CPT | Mod: 26

## 2022-08-08 PROCEDURE — 93010 ELECTROCARDIOGRAM REPORT: CPT

## 2022-08-08 PROCEDURE — 74177 CT ABD & PELVIS W/CONTRAST: CPT | Mod: 26,MA

## 2022-08-08 RX ORDER — ONDANSETRON 8 MG/1
8 TABLET, FILM COATED ORAL ONCE
Refills: 0 | Status: COMPLETED | OUTPATIENT
Start: 2022-08-08 | End: 2022-08-08

## 2022-08-08 RX ORDER — MORPHINE SULFATE 50 MG/1
4 CAPSULE, EXTENDED RELEASE ORAL ONCE
Refills: 0 | Status: DISCONTINUED | OUTPATIENT
Start: 2022-08-08 | End: 2022-08-08

## 2022-08-08 RX ORDER — SODIUM CHLORIDE 9 MG/ML
3000 INJECTION, SOLUTION INTRAVENOUS ONCE
Refills: 0 | Status: COMPLETED | OUTPATIENT
Start: 2022-08-08 | End: 2022-08-08

## 2022-08-08 RX ADMIN — SODIUM CHLORIDE 3000 MILLILITER(S): 9 INJECTION, SOLUTION INTRAVENOUS at 18:25

## 2022-08-08 RX ADMIN — Medication 0.2 MILLIGRAM(S): at 18:52

## 2022-08-08 RX ADMIN — ONDANSETRON 8 MILLIGRAM(S): 8 TABLET, FILM COATED ORAL at 18:25

## 2022-08-08 RX ADMIN — Medication 30 MILLILITER(S): at 18:25

## 2022-08-08 RX ADMIN — MORPHINE SULFATE 4 MILLIGRAM(S): 50 CAPSULE, EXTENDED RELEASE ORAL at 22:10

## 2022-08-08 RX ADMIN — MORPHINE SULFATE 4 MILLIGRAM(S): 50 CAPSULE, EXTENDED RELEASE ORAL at 22:07

## 2022-08-08 RX ADMIN — MORPHINE SULFATE 4 MILLIGRAM(S): 50 CAPSULE, EXTENDED RELEASE ORAL at 18:39

## 2022-08-08 NOTE — ED ADULT NURSE NOTE - ED STAT RN HANDOFF DETAILS
Pt cleared to dc by Dr Coon. Dc instructions explained pt verbalized full understanding. Pt left ambulatory with steady gait. VSS

## 2022-08-08 NOTE — ED PROVIDER NOTE - OBJECTIVE STATEMENT
32 y/o M with PMHx of DM, as per EMS, presents to the ED for weakness, dizziness, abdominal pain and chest pain. Pt reports that he had a DKA episode s/p going out and eating a lot at [facility]. Pt states that his blood glucose level was elevated to 350. Pt took insulin. Pt states that he also has difficult breathing. Pt reports that he smokes weed occasionally and drinks alcohol. off the note, pt drank a lot alcohol yesterday.

## 2022-08-08 NOTE — ED ADULT NURSE NOTE - OBJECTIVE STATEMENT
Patient is alert and oriented x4. Came in for mid chest pain radiating to lower abdomen that started today. Pain score is 9/10. Also complains of weakness, dizziness, nausea and vomiting. No diarrhea reported. Hx of DM Type 1. Also complains of shortness of breath. 95% on room air.

## 2022-08-08 NOTE — ED ADULT TRIAGE NOTE - CHIEF COMPLAINT QUOTE
as per ems, pt from home, pt c/o weakness, dizziness, difficulty walking. fs- 350, vomiting x 1. pt took lantus 40 units x 1hr ago. pt also c/o chest pain and abdominal pain started today. hx: DM type I

## 2022-08-08 NOTE — ED ADULT NURSE NOTE - FINAL NURSING ELECTRONIC SIGNATURE
09-Aug-2022 01:43 Hydroquinone Counseling:  Patient advised that medication may result in skin irritation, lightening (hypopigmentation), dryness, and burning.  In the event of skin irritation, the patient was advised to reduce the amount of the drug applied or use it less frequently.  Rarely, spots that are treated with hydroquinone can become darker (pseudoochronosis).  Should this occur, patient instructed to stop medication and call the office. The patient verbalized understanding of the proper use and possible adverse effects of hydroquinone.  All of the patient's questions and concerns were addressed.

## 2022-08-08 NOTE — ED PROVIDER NOTE - PROGRESS NOTE DETAILS
patient persistnetly tachcyardic, endorsing mild sob, repeat abdominal exam with ttp on left side, d-dimer negative. low risk PE, CT A/P no acute pathology, lactate trending down, patient reports he often times is tachycardic, troponin mildly elevated however trending lateral, ekg nonischemic, stbale for dischagre, unlikely cardiac pathology.

## 2022-08-08 NOTE — ED PROVIDER NOTE - PATIENT PORTAL LINK FT
You can access the FollowMyHealth Patient Portal offered by [facility] by registering at the following website: [email or web address]. By joining [employer]’s FollowMyHealth portal, you will also be able to view your health information using other applications (apps) compatible with our system.

## 2022-08-08 NOTE — ED PROVIDER NOTE - CLINICAL SUMMARY MEDICAL DECISION MAKING FREE TEXT BOX
Pt is alcohol intoxicated, with hyperglycemia, r/o DKA. Pt is alcohol intoxicated, with hyperglycemia, r/o DKA.  I read ekg as sinus tach rate 134, narrow qrs, normal axis, no st elevation or depression, qtc 430, narrow qrs.

## 2022-08-09 VITALS
RESPIRATION RATE: 20 BRPM | SYSTOLIC BLOOD PRESSURE: 130 MMHG | OXYGEN SATURATION: 99 % | TEMPERATURE: 98 F | DIASTOLIC BLOOD PRESSURE: 78 MMHG | HEART RATE: 104 BPM

## 2022-08-09 LAB — B-OH-BUTYR SERPL-SCNC: 0.8 MMOL/L — HIGH

## 2023-02-17 NOTE — PROGRESS NOTE ADULT - TIME BILLING
GI
Is patient still taking? Then does she want the continuing dose pack?
labs, vitals, multidisciplinary team discussion
GI

## 2024-03-06 NOTE — PATIENT PROFILE ADULT - FUNCTIONAL ASSESSMENT - DAILY ACTIVITY ASSESSMENT TYPE
Medication: MetFORMIN ER (GLUMETZA) 1000 MG modified release 24 hr tablet   Last office visit date: 1/30/24  Medication Refill Protocol Failed.  Failed criteria: needs updated labs. Sent to clinician to review.     Admission

## 2024-06-04 ENCOUNTER — INPATIENT (INPATIENT)
Facility: HOSPITAL | Age: 34
LOS: 1 days | Discharge: HOME | End: 2024-06-06
Attending: GENERAL ACUTE CARE HOSPITAL | Admitting: GENERAL ACUTE CARE HOSPITAL
Payer: MEDICAID

## 2024-06-04 VITALS
SYSTOLIC BLOOD PRESSURE: 131 MMHG | RESPIRATION RATE: 18 BRPM | HEART RATE: 137 BPM | DIASTOLIC BLOOD PRESSURE: 97 MMHG | WEIGHT: 175.05 LBS | OXYGEN SATURATION: 99 % | TEMPERATURE: 99 F

## 2024-06-04 DIAGNOSIS — E10.10 TYPE 1 DIABETES MELLITUS WITH KETOACIDOSIS WITHOUT COMA: ICD-10-CM

## 2024-06-04 DIAGNOSIS — R74.8 ABNORMAL LEVELS OF OTHER SERUM ENZYMES: ICD-10-CM

## 2024-06-04 DIAGNOSIS — R74.01 ELEVATION OF LEVELS OF LIVER TRANSAMINASE LEVELS: ICD-10-CM

## 2024-06-04 DIAGNOSIS — E78.5 HYPERLIPIDEMIA, UNSPECIFIED: ICD-10-CM

## 2024-06-04 DIAGNOSIS — D72.829 ELEVATED WHITE BLOOD CELL COUNT, UNSPECIFIED: ICD-10-CM

## 2024-06-04 DIAGNOSIS — I10 ESSENTIAL (PRIMARY) HYPERTENSION: ICD-10-CM

## 2024-06-04 LAB
ACANTHOCYTES BLD QL SMEAR: SIGNIFICANT CHANGE UP
ALBUMIN SERPL ELPH-MCNC: 3.4 G/DL — SIGNIFICANT CHANGE UP (ref 3.3–5)
ALBUMIN SERPL ELPH-MCNC: 4 G/DL — SIGNIFICANT CHANGE UP (ref 3.3–5)
ALP SERPL-CCNC: 135 U/L — HIGH (ref 40–120)
ALP SERPL-CCNC: 165 U/L — HIGH (ref 40–120)
ALT FLD-CCNC: 101 U/L — HIGH (ref 12–78)
ALT FLD-CCNC: 119 U/L — HIGH (ref 12–78)
ANION GAP SERPL CALC-SCNC: 13 MMOL/L — SIGNIFICANT CHANGE UP (ref 5–17)
ANION GAP SERPL CALC-SCNC: 21 MMOL/L — HIGH (ref 5–17)
AST SERPL-CCNC: 107 U/L — HIGH (ref 15–37)
AST SERPL-CCNC: 149 U/L — HIGH (ref 15–37)
BASE EXCESS BLDV CALC-SCNC: -10.3 MMOL/L — LOW (ref -2–3)
BASOPHILS # BLD AUTO: 0 K/UL — SIGNIFICANT CHANGE UP (ref 0–0.2)
BASOPHILS NFR BLD AUTO: 0 % — SIGNIFICANT CHANGE UP (ref 0–2)
BILIRUB SERPL-MCNC: 1.6 MG/DL — HIGH (ref 0.2–1.2)
BILIRUB SERPL-MCNC: 1.6 MG/DL — HIGH (ref 0.2–1.2)
BLOOD GAS COMMENTS, VENOUS: SIGNIFICANT CHANGE UP
BUN SERPL-MCNC: 7 MG/DL — SIGNIFICANT CHANGE UP (ref 7–23)
BUN SERPL-MCNC: 8 MG/DL — SIGNIFICANT CHANGE UP (ref 7–23)
CALCIUM SERPL-MCNC: 8.4 MG/DL — LOW (ref 8.5–10.1)
CALCIUM SERPL-MCNC: 9 MG/DL — SIGNIFICANT CHANGE UP (ref 8.5–10.1)
CHLORIDE BLDV-SCNC: 98 MMOL/L — SIGNIFICANT CHANGE UP (ref 98–107)
CHLORIDE SERPL-SCNC: 102 MMOL/L — SIGNIFICANT CHANGE UP (ref 96–108)
CHLORIDE SERPL-SCNC: 96 MMOL/L — SIGNIFICANT CHANGE UP (ref 96–108)
CO2 BLDV-SCNC: 17 MMOL/L — LOW (ref 22–26)
CO2 SERPL-SCNC: 17 MMOL/L — LOW (ref 22–31)
CO2 SERPL-SCNC: 20 MMOL/L — LOW (ref 22–31)
CREAT SERPL-MCNC: 0.89 MG/DL — SIGNIFICANT CHANGE UP (ref 0.5–1.3)
CREAT SERPL-MCNC: 0.96 MG/DL — SIGNIFICANT CHANGE UP (ref 0.5–1.3)
EGFR: 108 ML/MIN/1.73M2 — SIGNIFICANT CHANGE UP
EGFR: 118 ML/MIN/1.73M2 — SIGNIFICANT CHANGE UP
EOSINOPHIL # BLD AUTO: 0 K/UL — SIGNIFICANT CHANGE UP (ref 0–0.5)
EOSINOPHIL NFR BLD AUTO: 0 % — SIGNIFICANT CHANGE UP (ref 0–6)
GAS PNL BLDV: 130 MMOL/L — LOW (ref 136–145)
GAS PNL BLDV: SIGNIFICANT CHANGE UP
GAS PNL BLDV: SIGNIFICANT CHANGE UP
GLUCOSE BLDV-MCNC: 209 MG/DL — HIGH (ref 65–95)
GLUCOSE SERPL-MCNC: 121 MG/DL — HIGH (ref 70–99)
GLUCOSE SERPL-MCNC: 244 MG/DL — HIGH (ref 70–99)
HCO3 BLDV-SCNC: 16 MMOL/L — LOW (ref 22–28)
HCT VFR BLD CALC: 40.2 % — SIGNIFICANT CHANGE UP (ref 39–50)
HCT VFR BLDA CALC: 36 % — LOW (ref 37–47)
HGB BLD CALC-MCNC: 12 G/DL — LOW (ref 12.6–17.4)
HGB BLD-MCNC: 13.1 G/DL — SIGNIFICANT CHANGE UP (ref 13–17)
HOROWITZ INDEX BLDV+IHG-RTO: SIGNIFICANT CHANGE UP
LACTATE BLDV-MCNC: 1.7 MMOL/L — HIGH (ref 0.56–1.39)
LACTATE SERPL-SCNC: 1.6 MMOL/L — SIGNIFICANT CHANGE UP (ref 0.7–2)
LACTATE SERPL-SCNC: 1.8 MMOL/L — SIGNIFICANT CHANGE UP (ref 0.7–2)
LG PLATELETS BLD QL AUTO: SLIGHT — SIGNIFICANT CHANGE UP
LIDOCAIN IGE QN: 28 U/L — SIGNIFICANT CHANGE UP (ref 13–75)
LYMPHOCYTES # BLD AUTO: 0.18 K/UL — LOW (ref 1–3.3)
LYMPHOCYTES # BLD AUTO: 1 % — LOW (ref 13–44)
MACROCYTES BLD QL: SLIGHT — SIGNIFICANT CHANGE UP
MANUAL SMEAR VERIFICATION: YES — SIGNIFICANT CHANGE UP
MCHC RBC-ENTMCNC: 32 PG — SIGNIFICANT CHANGE UP (ref 27–34)
MCHC RBC-ENTMCNC: 32.6 G/DL — SIGNIFICANT CHANGE UP (ref 32–36)
MCV RBC AUTO: 98.3 FL — SIGNIFICANT CHANGE UP (ref 80–100)
MONOCYTES # BLD AUTO: 0.35 K/UL — SIGNIFICANT CHANGE UP (ref 0–0.9)
MONOCYTES NFR BLD AUTO: 2 % — SIGNIFICANT CHANGE UP (ref 2–14)
NEUTROPHILS # BLD AUTO: 17.05 K/UL — HIGH (ref 1.8–7.4)
NEUTROPHILS NFR BLD AUTO: 95 % — HIGH (ref 43–77)
NEUTS BAND # BLD: 2 % — SIGNIFICANT CHANGE UP (ref 0–8)
NRBC # BLD: 0 /100 WBCS — SIGNIFICANT CHANGE UP (ref 0–0)
NRBC # BLD: SIGNIFICANT CHANGE UP /100 WBCS (ref 0–0)
PCO2 BLDV: 37 MMHG — LOW (ref 42–55)
PH BLDV: 7.25 — LOW (ref 7.32–7.43)
PLAT MORPH BLD: NORMAL — SIGNIFICANT CHANGE UP
PLATELET # BLD AUTO: 345 K/UL — SIGNIFICANT CHANGE UP (ref 150–400)
PO2 BLDV: 44 MMHG — SIGNIFICANT CHANGE UP (ref 25–45)
POTASSIUM BLDV-SCNC: 5 MMOL/L — SIGNIFICANT CHANGE UP (ref 3.5–5.1)
POTASSIUM SERPL-MCNC: 4.3 MMOL/L — SIGNIFICANT CHANGE UP (ref 3.5–5.3)
POTASSIUM SERPL-MCNC: 4.4 MMOL/L — SIGNIFICANT CHANGE UP (ref 3.5–5.3)
POTASSIUM SERPL-SCNC: 4.3 MMOL/L — SIGNIFICANT CHANGE UP (ref 3.5–5.3)
POTASSIUM SERPL-SCNC: 4.4 MMOL/L — SIGNIFICANT CHANGE UP (ref 3.5–5.3)
PROT SERPL-MCNC: 7.5 GM/DL — SIGNIFICANT CHANGE UP (ref 6–8.3)
PROT SERPL-MCNC: 8.7 GM/DL — HIGH (ref 6–8.3)
RBC # BLD: 4.09 M/UL — LOW (ref 4.2–5.8)
RBC # FLD: 13.7 % — SIGNIFICANT CHANGE UP (ref 10.3–14.5)
RBC BLD AUTO: SIGNIFICANT CHANGE UP
SAO2 % BLDV: 68.9 % — LOW (ref 94–98)
SODIUM SERPL-SCNC: 134 MMOL/L — LOW (ref 135–145)
SODIUM SERPL-SCNC: 135 MMOL/L — SIGNIFICANT CHANGE UP (ref 135–145)
TROPONIN I, HIGH SENSITIVITY RESULT: 74.2 NG/L — SIGNIFICANT CHANGE UP
WBC # BLD: 17.58 K/UL — HIGH (ref 3.8–10.5)
WBC # FLD AUTO: 17.58 K/UL — HIGH (ref 3.8–10.5)

## 2024-06-04 PROCEDURE — 76705 ECHO EXAM OF ABDOMEN: CPT | Mod: 26

## 2024-06-04 PROCEDURE — 93010 ELECTROCARDIOGRAM REPORT: CPT

## 2024-06-04 PROCEDURE — 99222 1ST HOSP IP/OBS MODERATE 55: CPT

## 2024-06-04 PROCEDURE — 99285 EMERGENCY DEPT VISIT HI MDM: CPT

## 2024-06-04 PROCEDURE — 71045 X-RAY EXAM CHEST 1 VIEW: CPT | Mod: 26

## 2024-06-04 RX ORDER — DEXTROSE 50 % IN WATER 50 %
25 SYRINGE (ML) INTRAVENOUS ONCE
Refills: 0 | Status: DISCONTINUED | OUTPATIENT
Start: 2024-06-04 | End: 2024-06-06

## 2024-06-04 RX ORDER — SIMVASTATIN 20 MG/1
20 TABLET, FILM COATED ORAL AT BEDTIME
Refills: 0 | Status: DISCONTINUED | OUTPATIENT
Start: 2024-06-04 | End: 2024-06-06

## 2024-06-04 RX ORDER — INSULIN LISPRO 100/ML
VIAL (ML) SUBCUTANEOUS
Refills: 0 | Status: DISCONTINUED | OUTPATIENT
Start: 2024-06-04 | End: 2024-06-06

## 2024-06-04 RX ORDER — INSULIN GLARGINE 100 [IU]/ML
60 INJECTION, SOLUTION SUBCUTANEOUS EVERY MORNING
Refills: 0 | Status: DISCONTINUED | OUTPATIENT
Start: 2024-06-05 | End: 2024-06-05

## 2024-06-04 RX ORDER — ACETAMINOPHEN 500 MG
650 TABLET ORAL EVERY 6 HOURS
Refills: 0 | Status: DISCONTINUED | OUTPATIENT
Start: 2024-06-04 | End: 2024-06-06

## 2024-06-04 RX ORDER — GLUCAGON INJECTION, SOLUTION 0.5 MG/.1ML
1 INJECTION, SOLUTION SUBCUTANEOUS ONCE
Refills: 0 | Status: DISCONTINUED | OUTPATIENT
Start: 2024-06-04 | End: 2024-06-06

## 2024-06-04 RX ORDER — LISINOPRIL 2.5 MG/1
40 TABLET ORAL DAILY
Refills: 0 | Status: DISCONTINUED | OUTPATIENT
Start: 2024-06-04 | End: 2024-06-06

## 2024-06-04 RX ORDER — SODIUM BICARBONATE 1 MEQ/ML
50 SYRINGE (ML) INTRAVENOUS ONCE
Refills: 0 | Status: COMPLETED | OUTPATIENT
Start: 2024-06-04 | End: 2024-06-04

## 2024-06-04 RX ORDER — DEXTROSE 50 % IN WATER 50 %
15 SYRINGE (ML) INTRAVENOUS ONCE
Refills: 0 | Status: DISCONTINUED | OUTPATIENT
Start: 2024-06-04 | End: 2024-06-06

## 2024-06-04 RX ORDER — INSULIN ASPART 100 [IU]/ML
0 INJECTION, SOLUTION SUBCUTANEOUS
Qty: 0 | Refills: 0 | DISCHARGE

## 2024-06-04 RX ORDER — INSULIN HUMAN 100 [IU]/ML
5 INJECTION, SOLUTION SUBCUTANEOUS ONCE
Refills: 0 | Status: COMPLETED | OUTPATIENT
Start: 2024-06-04 | End: 2024-06-04

## 2024-06-04 RX ORDER — INSULIN LISPRO 100/ML
0 VIAL (ML) SUBCUTANEOUS
Refills: 0 | DISCHARGE

## 2024-06-04 RX ORDER — SODIUM CHLORIDE 9 MG/ML
1000 INJECTION INTRAMUSCULAR; INTRAVENOUS; SUBCUTANEOUS ONCE
Refills: 0 | Status: COMPLETED | OUTPATIENT
Start: 2024-06-04 | End: 2024-06-04

## 2024-06-04 RX ORDER — INSULIN GLARGINE 100 [IU]/ML
60 INJECTION, SOLUTION SUBCUTANEOUS
Refills: 0 | DISCHARGE

## 2024-06-04 RX ORDER — FAMOTIDINE 10 MG/ML
20 INJECTION INTRAVENOUS ONCE
Refills: 0 | Status: COMPLETED | OUTPATIENT
Start: 2024-06-04 | End: 2024-06-04

## 2024-06-04 RX ORDER — LANOLIN ALCOHOL/MO/W.PET/CERES
3 CREAM (GRAM) TOPICAL AT BEDTIME
Refills: 0 | Status: DISCONTINUED | OUTPATIENT
Start: 2024-06-04 | End: 2024-06-06

## 2024-06-04 RX ORDER — SODIUM CHLORIDE 9 MG/ML
1000 INJECTION, SOLUTION INTRAVENOUS
Refills: 0 | Status: DISCONTINUED | OUTPATIENT
Start: 2024-06-04 | End: 2024-06-06

## 2024-06-04 RX ORDER — ONDANSETRON 8 MG/1
4 TABLET, FILM COATED ORAL EVERY 8 HOURS
Refills: 0 | Status: DISCONTINUED | OUTPATIENT
Start: 2024-06-04 | End: 2024-06-06

## 2024-06-04 RX ORDER — METOCLOPRAMIDE HCL 10 MG
10 TABLET ORAL ONCE
Refills: 0 | Status: COMPLETED | OUTPATIENT
Start: 2024-06-04 | End: 2024-06-04

## 2024-06-04 RX ORDER — FAMOTIDINE 10 MG/ML
1 INJECTION INTRAVENOUS
Qty: 0 | Refills: 0 | DISCHARGE

## 2024-06-04 RX ORDER — MORPHINE SULFATE 50 MG/1
4 CAPSULE, EXTENDED RELEASE ORAL ONCE
Refills: 0 | Status: DISCONTINUED | OUTPATIENT
Start: 2024-06-04 | End: 2024-06-04

## 2024-06-04 RX ORDER — MORPHINE SULFATE 50 MG/1
2 CAPSULE, EXTENDED RELEASE ORAL ONCE
Refills: 0 | Status: DISCONTINUED | OUTPATIENT
Start: 2024-06-04 | End: 2024-06-04

## 2024-06-04 RX ORDER — INSULIN DEGLUDEC 100 U/ML
0 INJECTION, SOLUTION SUBCUTANEOUS
Qty: 0 | Refills: 0 | DISCHARGE

## 2024-06-04 RX ORDER — DEXTROSE 50 % IN WATER 50 %
12.5 SYRINGE (ML) INTRAVENOUS ONCE
Refills: 0 | Status: DISCONTINUED | OUTPATIENT
Start: 2024-06-04 | End: 2024-06-06

## 2024-06-04 RX ORDER — DEXTROSE 10 % IN WATER 10 %
125 INTRAVENOUS SOLUTION INTRAVENOUS ONCE
Refills: 0 | Status: DISCONTINUED | OUTPATIENT
Start: 2024-06-04 | End: 2024-06-06

## 2024-06-04 RX ADMIN — SODIUM CHLORIDE 1000 MILLILITER(S): 9 INJECTION INTRAMUSCULAR; INTRAVENOUS; SUBCUTANEOUS at 18:33

## 2024-06-04 RX ADMIN — Medication 10 MILLIGRAM(S): at 16:47

## 2024-06-04 RX ADMIN — SODIUM CHLORIDE 1000 MILLILITER(S): 9 INJECTION INTRAMUSCULAR; INTRAVENOUS; SUBCUTANEOUS at 16:47

## 2024-06-04 RX ADMIN — SIMVASTATIN 20 MILLIGRAM(S): 20 TABLET, FILM COATED ORAL at 23:01

## 2024-06-04 RX ADMIN — MORPHINE SULFATE 4 MILLIGRAM(S): 50 CAPSULE, EXTENDED RELEASE ORAL at 21:49

## 2024-06-04 RX ADMIN — FAMOTIDINE 20 MILLIGRAM(S): 10 INJECTION INTRAVENOUS at 16:47

## 2024-06-04 RX ADMIN — MORPHINE SULFATE 2 MILLIGRAM(S): 50 CAPSULE, EXTENDED RELEASE ORAL at 16:54

## 2024-06-04 RX ADMIN — INSULIN HUMAN 5 UNIT(S): 100 INJECTION, SOLUTION SUBCUTANEOUS at 18:34

## 2024-06-04 RX ADMIN — Medication 50 MILLIEQUIVALENT(S): at 21:49

## 2024-06-04 NOTE — CONSULT NOTE ADULT - SUBJECTIVE AND OBJECTIVE BOX
Mr. Whitlock is a 33 year old male with a PMH of HTN, HLD, and DM type 1 with prior hospitalization reported for DKA who presented to [facility] earlier today with c/o persistent nausea / vomiting starting this AM with + epigastric / RUQ abdominal pain. Patient states he left work because he noted his glucose remained elevated throughout the day despite taking his Lantus 60 units this AM and he did not eat. Initial laboratory workup revealed AG 21 with + Ketones and glucose in mid 200's range. Patient also noted that he only ate some fruit yesterday as he did not feel hungry. Pt tx with low dose insulin and IVF resuscitation. ROS otherwise negative and pt is compliant with his insulin regimen. ICU team consulted.        Allergies  No Known Allergies      MEDICATIONS  (STANDING):  dextrose 10% Bolus 125 milliLiter(s) IV Bolus once  dextrose 5%. 1000 milliLiter(s) (100 mL/Hr) IV Continuous <Continuous>  dextrose 5%. 1000 milliLiter(s) (50 mL/Hr) IV Continuous <Continuous>  dextrose 50% Injectable 25 Gram(s) IV Push once  dextrose 50% Injectable 12.5 Gram(s) IV Push once  glucagon  Injectable 1 milliGRAM(s) IntraMuscular once  insulin lispro (ADMELOG) corrective regimen sliding scale   SubCutaneous three times a day before meals  lisinopril 40 milliGRAM(s) Oral daily  simvastatin 20 milliGRAM(s) Oral at bedtime    MEDICATIONS  (PRN):  acetaminophen     Tablet .. 650 milliGRAM(s) Oral every 6 hours PRN Temp greater or equal to 38C (100.4F), Mild Pain (1 - 3)  aluminum hydroxide/magnesium hydroxide/simethicone Suspension 30 milliLiter(s) Oral every 4 hours PRN Dyspepsia  dextrose Oral Gel 15 Gram(s) Oral once PRN Blood Glucose LESS THAN 70 milliGRAM(s)/deciliter  melatonin 3 milliGRAM(s) Oral at bedtime PRN Insomnia  ondansetron Injectable 4 milliGRAM(s) IV Push every 8 hours PRN Nausea and/or Vomiting      Drug Dosing Weight  Height (cm): 185.4 (08 Aug 2022 17:12)  Weight (kg): 79.4 (04 Jun 2024 15:54)  BMI (kg/m2): 23.1 (04 Jun 2024 15:54)  BSA (m2): 2.03 (04 Jun 2024 15:54)      PAST MEDICAL & SURGICAL HISTORY:  HTN (hypertension)  HLD (hyperlipidemia)  Insulin dependent type 1 diabetes mellitus        SOCIAL HISTORY: 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 Denies etoh abuse, drug abuse or smoking.       ADVANCE DIRECTIVES: Full code      REVIEW OF SYSTEMS: See HPI, ROS otherwise negative.       Vital Signs Last 24 Hrs  T(C): 36.7 (04 Jun 2024 18:00), Max: 37.3 (04 Jun 2024 15:54)  T(F): 98 (04 Jun 2024 18:00), Max: 99.2 (04 Jun 2024 15:54)  HR: 119 (04 Jun 2024 18:00) (119 - 137)  BP: 132/67 (04 Jun 2024 18:00) (131/97 - 132/67)  RR: 15 (04 Jun 2024 18:00) (15 - 18)  SpO2: 100% (04 Jun 2024 18:00) (99% - 100%)    O2 Parameters below as of 04 Jun 2024 15:54  Patient On (Oxygen Delivery Method): room air        PHYSICAL EXAM:  GENERAL: NAD, well-groomed, well-developed  HEAD:  Atraumatic, Normocephalic  EYES: EOMI, PERRL, conjunctiva and sclera clear  ENMT: No tonsillar erythema, exudates, or enlargement; Moist mucous membranes, Good dentition, No lesions  NECK: Supple, No JVD, Normal thyroid  NERVOUS SYSTEM:  Alert & Oriented X3, Good concentration; Motor Strength 5/5 B/L upper and lower extremities  CHEST/LUNG: Clear to percussion bilaterally; No rales, rhonchi, wheezing, or rubs  HEART: Regular rate and rhythm; No murmurs, rubs, or gallops  ABDOMEN: Soft, TTP in upper abdomen, Nondistended; Bowel sounds present  EXTREMITIES:  2+ Peripheral Pulses, No clubbing, cyanosis, or edema  LYMPH: No lymphadenopathy noted  SKIN: No rashes or lesions      LABS:  CBC Full  -  ( 04 Jun 2024 17:05 )  WBC Count : 17.58 K/uL  RBC Count : 4.09 M/uL  Hemoglobin : 13.1 g/dL  Hematocrit : 40.2 %  Platelet Count - Automated : 345 K/uL  Mean Cell Volume : 98.3 fl  Mean Cell Hemoglobin : 32.0 pg  Mean Cell Hemoglobin Concentration : 32.6 g/dL  Auto Neutrophil # : 17.05 K/uL  Auto Lymphocyte # : 0.18 K/uL  Auto Monocyte # : 0.35 K/uL  Auto Eosinophil # : 0.00 K/uL  Auto Basophil # : 0.00 K/uL  Auto Neutrophil % : 95.0 %  Auto Lymphocyte % : 1.0 %  Auto Monocyte % : 2.0 %  Auto Eosinophil % : 0.0 %  Auto Basophil % : 0.0 %    06-04    135  |  102  |  7   ----------------------------<  121<H>  4.4   |  20<L>  |  0.89    Ca    8.4<L>      04 Jun 2024 20:45    TPro  7.5  /  Alb  3.4  /  TBili  1.6<H>  /  DBili  x   /  AST  107<H>  /  ALT  101<H>  /  AlkPhos  135<H>  06-04    CAPILLARY BLOOD GLUCOSE      POCT Blood Glucose.: 113 mg/dL (04 Jun 2024 21:24)      Urinalysis Basic - ( 04 Jun 2024 20:45 )  Color: x / Appearance: x / SG: x / pH: x  Gluc: 121 mg/dL / Ketone: x  / Bili: x / Urobili: x   Blood: x / Protein: x / Nitrite: x   Leuk Esterase: x / RBC: x / WBC x   Sq Epi: x / Non Sq Epi: x / Bacteria: x        CRITICAL CARE TIME SPENT: 40 mins

## 2024-06-04 NOTE — ED PROVIDER NOTE - PHYSICAL EXAMINATION
GENERAL: Awake, alert, NAD  HEENT: NC/AT, dry mucous membranes  LUNGS: CTAB, no wheezes or crackles   CARDIAC: tachycardic, regular rhythm, no m/r/g  ABDOMEN: Soft, +mild epigastric tenderness,, non distended, no rebound, no guarding  BACK: No midline spinal tenderness, no CVA tenderness  EXT: No edema, no calf tenderness, no deformities.  NEURO: A&Ox3. Moving all extremities.  SKIN: Warm and dry. No rash.  PSYCH: Normal affect.

## 2024-06-04 NOTE — ED ADULT NURSE NOTE - HOW OFTEN DO YOU HAVE A DRINK CONTAINING ALCOHOL?
Start developing loss of sensation of the entire arm or significant pain that is getting worse or discoloration to your arm that is pale white and consider reevaluation.   Never

## 2024-06-04 NOTE — ED ADULT NURSE NOTE - NSFALLRISK_ED_ALL_ED
This patient has been assessed with a concern for Malnutrition and has been determined to have a diagnosis/diagnoses of Severe protein-calorie malnutrition.    This patient is being managed with:   Parenteral Nutrition - Adult-  Entered: Jun 24 2022  5:00PM    Diet NPO-  Entered: Jun 23 2022  6:44PM     No

## 2024-06-04 NOTE — CONSULT NOTE ADULT - ASSESSMENT
33 year old male with a PMH of HTN, HLD, and DM type 1 with prior hospitalization reported for DKA presenting to [facility] with c/o N/V, abdominal pain and persistent hyperglycemia. Pt noted to have elevated AG and + ketones as well as electrolyte derangements. ICU consulted.    Patient seen / examined in the ED this evening. He was alert, conversive, cooperative and NAD. VSS. Pt is afebrile and does not report any other complaints or recent illness or known sick contacts.     Given the patient has improved s/p IVF and low dose insulin, [id number] to admit to medicine service for further monitoring and IVF resuscitation. He is not currently an ICU candidate. Consider ?infectious etiology which is not clear at this moment. Likely starvation ketoacidosis component with ?mild DKA.       Please recontact ICU team if any changes should occur. Thanks!    d/w ICU and ED attending physicians who are both in agreement.

## 2024-06-04 NOTE — H&P ADULT - ASSESSMENT
Clem Whitlock is a 33 year old male with PMHx of HTN, HLD, and IDDM1 who presented to the ED on 6/4/24 for complaints of abdominal pain and vomiting and admitted for mild DKA.    Mild DKA, improving  Complaints of epigastric and abdominal pain that started this AM with associated nausea and bilious emesis  Bicarb 17, AG 21, blood glucose 244 on admission, last known A1c 7.1 (on 6/13/22)  VBG 7.25 / 37 / 44 / 16, moderate acetone in blood, lactic acid WNL  S/p 2L NS bolus, insulin 5 U IV, famotidine 20 mg IV, metoclopramide 10 mg IV, and morphine 6 mg IV in the ED  Bicarb 50 mEq x 1 ordered  POC qac and qhs, PTA Lantus 60 U qam + Humalog SSI qac  Blood glucose goal < 180, f/u A1c  Monitor BMP and electrolytes closely    Elevated alkaline phosphatase, improving  Transaminitis, improving  Alkphos 165, ,  on admission  R factor 2.2 which suggests mixed injury  RUQ U/S with fatty liver and without gallstones  Avoid hepatotoxic agents  F/u hepatitis panel, anti-smooth muscle Ab  Monitor LFTs    Leukocytosis, likely reactive  Afebrile, WBC 17.58K on admission  No signs of infection  Monitor WBC trend      Chronic medical conditions:   HTN: PTA ramipril 10 mg reordered as lisinopril 40 mg given ramipril not on formulary  HLD: PTA simvastatin 20 mg qhs    Medication reconciliation completed using med list provided by patient.

## 2024-06-04 NOTE — ED PROVIDER NOTE - NS ED ROS FT
CONST: no fevers, no chills  EYES: no pain, no vision changes  ENT: no sore throat, no ear pain, no change in hearing  CV: + chest pain, no leg swelling  RESP: no shortness of breath, no cough  ABD: + abdominal pain, + nausea, + vomiting, no diarrhea  : no dysuria, no flank pain, no hematuria  MSK: no back pain, no extremity pain  NEURO: no headache or additional neurologic complaints  HEME: no easy bleeding  SKIN:  no rash

## 2024-06-04 NOTE — H&P ADULT - NSICDXPASTMEDICALHX_GEN_ALL_CORE_FT
PAST MEDICAL HISTORY:  HLD (hyperlipidemia)     HTN (hypertension)     Insulin dependent type 1 diabetes mellitus

## 2024-06-04 NOTE — ED PROVIDER NOTE - CLINICAL SUMMARY MEDICAL DECISION MAKING FREE TEXT BOX
32 y/o M with PMH DM (type 1), acid reflux, presenting to the ED c/o N/V, chest pain, palpitations.   Patient is tachycardic in the ED. Dry mucus membranes.  Suspect gastroparesis.  Exam with +epigastric tenderness.  Will obtain RUQ U/S to r/o gallbladder vs biliary pathology  Plan for reglan/pepcid/fluids  Patient requesting morphine for pain  Plan to r/o DKA with labs, bolus fluids 32 y/o M with PMH DM (type 1), acid reflux, presenting to the ED c/o N/V, chest pain, palpitations.   Patient is tachycardic in the ED. Dry mucus membranes.  Suspect gastroparesis.  Exam with +epigastric tenderness.  Will obtain RUQ U/S to r/o gallbladder vs biliary pathology  Plan for reglan/pepcid/fluids  Patient requesting morphine for pain  Plan to r/o DKA with labs, bolus fluids      Patient with early DKA noted ultrasound negative for acute pathology and otherwise patient nausea vomiting under control.  ICU consulted given diagnosis and otherwise cleared for telemetry given improvement.  Lactate from 1.8-1.6.  Patient continues to feel well and will be admitted for further care with Dr. Michelle.

## 2024-06-04 NOTE — ED ADULT TRIAGE NOTE - WEIGHT IN LBS
CONSULTATION NOTE - INFECTIOUS DISEASES      Mr. Cleaning is evaluated today at the request of Dr. Alison Martinez. Reason for consult Lymphocytic meningitis    CHIEF COMPLAINT  Headache New Onset on New Symptom (patient has had a headache and chills since Tuesday night)         HISTORY OF PRESENT ILLNESS  Mr. Cleaning is a 33 year old male, who presents with a 2 days history of headaches and fever, neck and back pain. He had been doing well until Tuesday when his symptoms began, the headache is worse if he bend down and tylenol makes it a little better. He has no exposures to infections but was born [place]in [place], he has been living in the [place]. He does not know anyone who had TB. He has no pets and no exposure to wild animal. He has developed a rash on the abdomen today.     REVIEW OF SYSTEMS    Review of Systems   Constitutional: Positive for fever. Negative for chills and diaphoresis.   HENT: Negative for congestion, ear discharge, ear pain, facial swelling, mouth sores, rhinorrhea, sinus pain and sore throat.    Eyes: Positive for visual disturbance (far away objects are blurry but has no photophobia or diplopia). Negative for photophobia, pain, redness and itching.   Respiratory: Negative for cough, shortness of breath and wheezing.    Cardiovascular: Negative for chest pain, palpitations and leg swelling.   Gastrointestinal: Negative for abdominal distention, abdominal pain, diarrhea, nausea and vomiting.   Endocrine: Negative for polydipsia and polyuria.   Genitourinary: Negative for difficulty urinating, dysuria, flank pain, frequency and hematuria.   Musculoskeletal: Positive for neck pain and neck stiffness. Negative for back pain, joint swelling and myalgias.   Skin: Positive for rash. Negative for wound.   Neurological: Positive for headaches. Negative for dizziness, seizures, facial asymmetry, speech difficulty, weakness, light-headedness and numbness.   Hematological: Negative for adenopathy.           HISTORIES  History reviewed. No pertinent past medical history.    Past Surgical History:   Procedure Laterality Date   • Hand nerve repair         History reviewed. No pertinent family history.     with 2 kids     ALLERGIES     Allergies as of 10/10/2019   • (No Known Allergies)       MEDICATIONS  Current Facility-Administered Medications   Medication Dose Route Frequency Provider Last Rate Last Dose   • SODIUM CHLORIDE 0.9 % IV SOLN Pyxis Override            • famotidine (PEPCID) injection 20 mg  20 mg Intravenous 2 times per day Alison Martinez MD   20 mg at 10/11/19 0947   • HYDROcodone-acetaminophen (NORCO) 5-325 MG per tablet 1 tablet  1 tablet Oral Q4H PRN Alison Martinez MD   1 tablet at 10/11/19 0947   • acyclovir (ZOVIRAX) 800 mg in dextrose 5 % 150 mL IVPB  800 mg Intravenous 3 times per day Alison Martinez MD       • VANCOMYCIN - PHARMACIST MONITORED   Does not apply See Admin Instructions Alison Martinez MD       • vancomycin 1,500 mg in sodium chloride 0.9% 250 mL IVPB  1,500 mg Intravenous 2 times per day Abhijit Johnson MD       • ketorolac injection 15 mg  15 mg Intravenous Q6H PRN Alison Martinez MD   15 mg at 10/11/19 1249   • cefTRIAXone (ROCEPHIN) syringe 2,000 mg  2,000 mg Intravenous Q12H Alison Martinez MD   2,000 mg at 10/11/19 1351   • sodium chloride 0.9 % flush bag 500 mL  500 mL Intravenous PRN Alison Martinez MD   500 mL at 10/11/19 1358          PHYSICAL EXAM  Physical Exam   Constitutional: He is oriented to person, place, and time. He appears well-developed and well-nourished.   HENT:   Head: Normocephalic and atraumatic.   Mouth/Throat: Oropharynx is clear and moist.   Eyes: Pupils are equal, round, and reactive to light. Conjunctivae are normal.   Neck: Neck supple.   Cardiovascular: Normal rate, regular rhythm and normal heart sounds. Exam reveals no gallop.   No murmur heard.  Pulmonary/Chest: Breath sounds normal. No respiratory distress. He has no wheezes.   Abdominal: Soft.  Bowel sounds are normal. He exhibits no mass. There is no tenderness.   Lymphadenopathy:     He has no cervical adenopathy.   Neurological: He is alert and oriented to person, place, and time. No cranial nerve deficit.   Skin: Skin is warm. Rash (has a crop of 5 or 6 papular rash in the left lower quadrant of the abdomen, consistent with herpes simplex, no dermatomal distribution) noted. No erythema.   Psychiatric: He has a normal mood and affect. His behavior is normal. Judgment normal.        LABORATORY  I have reviewed the pertinent laboratory tests. These are the pertinent findings:  · Has lymphocytic pleocytosis consistent with meningitis,  WBC marginally elevated in the blood.    IMAGING  I have reviewed the pertinent imaging study reports. These are the pertinent findings:  · CT scan of head normal      ASSESSMENT/PLAN  1. Aseptic meningitis, lymphocytic with normal CSF glucose most likely due to herpes simplex or other virus r/o fungal and mycobacterial infection, though unlikely.  2. 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   3. Headaches.    Plan:  1. Rapid meningitis test on the CSF.  2. Continue acyclovir 800 mg iv q 8 hours.  3. Await culture results.  4. Stop the vancomycin.  5. Continue ceftriaxone for the meantime.  6. Quantiferon gold TB test  7. HIV screen. Discussed with patient with the help of Malagasy interpretor. All questions answered to his satisfaction.      A copy of this consultation has been sent electronically to Dr. Alison Martinez.   175

## 2024-06-04 NOTE — H&P ADULT - HISTORY OF PRESENT ILLNESS
Clem Whitlock is a 33 year old male with PMHx of HTN, HLD, and IDDM1 who presented to the ED on 6/4/24 for complaints of abdominal pain and vomiting.    Patient reports he started having abdominal pain primarily located in epigastric region and right upper quadrant this morning. Associated nausea and bilious vomiting which started at 9 AM. The last thing he consumed was an apple and orange yesterday for lunch because he did not feel hungry. Also had one episode of loose stool this morning. Denies recent travel or known sick contacts. Compliant with insulin. Came to the ER because he felt the same as when he was previously hospitalized in the ICU for DKA in May 2022.     In the ED, VSS except HR as elevated as 137. WBC 17.58K, sodium 134, bicarb 17, AG 21, blood glucose 244. Alkphos 165, , . Moderate acetone in blood. VBG 7.25 / 37 / 44 / 16. RUQ U/S with fatty liver and without gallstones. Received 2L NS bolus, insulin 5 U IV, famotidine 20 mg IV, metoclopramide 10 mg IV, and morphine 6 mg IV.

## 2024-06-04 NOTE — H&P ADULT - NSHPPHYSICALEXAM_GEN_ALL_CORE
T(C): 36.7 (06-04-24 @ 18:00), Max: 37.3 (06-04-24 @ 15:54)  HR: 119 (06-04-24 @ 18:00) (119 - 137)  BP: 132/67 (06-04-24 @ 18:00) (131/97 - 132/67)  RR: 15 (06-04-24 @ 18:00) (15 - 18)  SpO2: 100% (06-04-24 @ 18:00) (99% - 100%)    CONSTITUTIONAL: Well groomed, no apparent distress, pleasant, conversational  EYES: PERRLA and symmetric, EOMI  ENMT: Oral mucosa with moist membranes  RESP: No respiratory distress, no use of accessory muscles; CTA b/l  CV: tachycardic  GI: Soft, NT, ND

## 2024-06-04 NOTE — ED ADULT TRIAGE NOTE - CHIEF COMPLAINT QUOTE
pt c/o chest pain, racing hear rate, nausea and vomiting since this morning. history of  type 1 diabetes. fs 257 at triage.

## 2024-06-04 NOTE — ED PROVIDER NOTE - OBJECTIVE STATEMENT
34 y/o M with PMH DM (type 1), acid reflux, presenting to the ED c/o N/V, chest pain, palpitations. Patient states he has been vomiting since this morning. No diarrhea. No fever or chills. CP began after vomiting. States he is concerned he could be in DKA because he had similar symptoms previously. No dyspnea. No urinary complaints.

## 2024-06-04 NOTE — H&P ADULT - NSHPLABSRESULTS_GEN_ALL_CORE
13.1   17.58 )-----------( 345      ( 04 Jun 2024 17:05 )             40.2     135  |  102  |  7   ----------------------------<  121<H>     06-04  4.4   |  20<L>  |  0.89    Ca    8.4<L>      04 Jun 2024 20:45    TPro  7.5  /  Alb  3.4  /  TBili  1.6<H>  /  DBili  x   /  AST  107<H>  /  ALT  101<H>  /  AlkPhos  135<H>  06-04    18:33 - VBG - pH: 7.25  | pCO2: 37    | pO2: 44    | Lactate: 1.70     RUQ U/S 6/2/24  FINDINGS:  Liver: Increased echogenicity.  Bile ducts: Normal caliber. Common bile duct measures 4 mm.  Gallbladder: Within normal limits.  Pancreas: Visualized portions are within normal limits.  Right kidney: 10.7 cm. No hydronephrosis.  Ascites: None.  IVC: Visualized portions are within normal limits.    IMPRESSION:  Fatty liver. No evidence of cholelithiasis.

## 2024-06-05 ENCOUNTER — RESULT REVIEW (OUTPATIENT)
Age: 34
End: 2024-06-05

## 2024-06-05 LAB
A1C WITH ESTIMATED AVERAGE GLUCOSE RESULT: 6.3 % — HIGH (ref 4–5.6)
ALBUMIN SERPL ELPH-MCNC: 3.3 G/DL — SIGNIFICANT CHANGE UP (ref 3.3–5)
ALP SERPL-CCNC: 132 U/L — HIGH (ref 40–120)
ALT FLD-CCNC: 83 U/L — HIGH (ref 12–78)
ANION GAP SERPL CALC-SCNC: 8 MMOL/L — SIGNIFICANT CHANGE UP (ref 5–17)
AST SERPL-CCNC: 99 U/L — HIGH (ref 15–37)
B-OH-BUTYR SERPL-SCNC: 4.3 MMOL/L — HIGH
BILIRUB DIRECT SERPL-MCNC: 0.6 MG/DL — HIGH (ref 0–0.3)
BILIRUB INDIRECT FLD-MCNC: 1.2 MG/DL — HIGH (ref 0.2–1)
BILIRUB SERPL-MCNC: 1.8 MG/DL — HIGH (ref 0.2–1.2)
BUN SERPL-MCNC: 6 MG/DL — LOW (ref 7–23)
CALCIUM SERPL-MCNC: 9.3 MG/DL — SIGNIFICANT CHANGE UP (ref 8.5–10.1)
CHLORIDE SERPL-SCNC: 100 MMOL/L — SIGNIFICANT CHANGE UP (ref 96–108)
CK MB BLD-MCNC: 1.4 % — SIGNIFICANT CHANGE UP (ref 0–3.5)
CK MB CFR SERPL CALC: 1.1 NG/ML — SIGNIFICANT CHANGE UP (ref 0.5–3.6)
CK SERPL-CCNC: 81 U/L — SIGNIFICANT CHANGE UP (ref 26–308)
CO2 SERPL-SCNC: 28 MMOL/L — SIGNIFICANT CHANGE UP (ref 22–31)
CREAT SERPL-MCNC: 0.64 MG/DL — SIGNIFICANT CHANGE UP (ref 0.5–1.3)
EGFR: 130 ML/MIN/1.73M2 — SIGNIFICANT CHANGE UP
ESTIMATED AVERAGE GLUCOSE: 134 MG/DL — HIGH (ref 68–114)
GLUCOSE BLDC GLUCOMTR-MCNC: 109 MG/DL — HIGH (ref 70–99)
GLUCOSE BLDC GLUCOMTR-MCNC: 120 MG/DL — HIGH (ref 70–99)
GLUCOSE BLDC GLUCOMTR-MCNC: 210 MG/DL — HIGH (ref 70–99)
GLUCOSE BLDC GLUCOMTR-MCNC: 218 MG/DL — HIGH (ref 70–99)
GLUCOSE BLDC GLUCOMTR-MCNC: 68 MG/DL — LOW (ref 70–99)
GLUCOSE BLDC GLUCOMTR-MCNC: 69 MG/DL — LOW (ref 70–99)
GLUCOSE BLDC GLUCOMTR-MCNC: 77 MG/DL — SIGNIFICANT CHANGE UP (ref 70–99)
GLUCOSE BLDC GLUCOMTR-MCNC: 97 MG/DL — SIGNIFICANT CHANGE UP (ref 70–99)
GLUCOSE SERPL-MCNC: 62 MG/DL — LOW (ref 70–99)
HCT VFR BLD CALC: 35.5 % — LOW (ref 39–50)
HGB BLD-MCNC: 12 G/DL — LOW (ref 13–17)
MAGNESIUM SERPL-MCNC: 1.8 MG/DL — SIGNIFICANT CHANGE UP (ref 1.6–2.6)
MCHC RBC-ENTMCNC: 32.4 PG — SIGNIFICANT CHANGE UP (ref 27–34)
MCHC RBC-ENTMCNC: 33.8 G/DL — SIGNIFICANT CHANGE UP (ref 32–36)
MCV RBC AUTO: 95.9 FL — SIGNIFICANT CHANGE UP (ref 80–100)
NRBC # BLD: 0 /100 WBCS — SIGNIFICANT CHANGE UP (ref 0–0)
PHOSPHATE SERPL-MCNC: 2.5 MG/DL — SIGNIFICANT CHANGE UP (ref 2.5–4.5)
PLATELET # BLD AUTO: 281 K/UL — SIGNIFICANT CHANGE UP (ref 150–400)
POTASSIUM SERPL-MCNC: 3.3 MMOL/L — LOW (ref 3.5–5.3)
POTASSIUM SERPL-SCNC: 3.3 MMOL/L — LOW (ref 3.5–5.3)
PROT SERPL-MCNC: 7.2 GM/DL — SIGNIFICANT CHANGE UP (ref 6–8.3)
RBC # BLD: 3.7 M/UL — LOW (ref 4.2–5.8)
RBC # FLD: 13.3 % — SIGNIFICANT CHANGE UP (ref 10.3–14.5)
SODIUM SERPL-SCNC: 136 MMOL/L — SIGNIFICANT CHANGE UP (ref 135–145)
TROPONIN I, HIGH SENSITIVITY RESULT: 69.3 NG/L — SIGNIFICANT CHANGE UP
WBC # BLD: 9.95 K/UL — SIGNIFICANT CHANGE UP (ref 3.8–10.5)
WBC # FLD AUTO: 9.95 K/UL — SIGNIFICANT CHANGE UP (ref 3.8–10.5)

## 2024-06-05 PROCEDURE — 99232 SBSQ HOSP IP/OBS MODERATE 35: CPT

## 2024-06-05 PROCEDURE — 93306 TTE W/DOPPLER COMPLETE: CPT | Mod: 26

## 2024-06-05 PROCEDURE — 71045 X-RAY EXAM CHEST 1 VIEW: CPT | Mod: 26

## 2024-06-05 PROCEDURE — 99231 SBSQ HOSP IP/OBS SF/LOW 25: CPT

## 2024-06-05 PROCEDURE — 93010 ELECTROCARDIOGRAM REPORT: CPT

## 2024-06-05 RX ORDER — MORPHINE SULFATE 50 MG/1
2 CAPSULE, EXTENDED RELEASE ORAL ONCE
Refills: 0 | Status: DISCONTINUED | OUTPATIENT
Start: 2024-06-05 | End: 2024-06-05

## 2024-06-05 RX ORDER — PANTOPRAZOLE SODIUM 20 MG/1
40 TABLET, DELAYED RELEASE ORAL DAILY
Refills: 0 | Status: DISCONTINUED | OUTPATIENT
Start: 2024-06-05 | End: 2024-06-06

## 2024-06-05 RX ORDER — KETOROLAC TROMETHAMINE 30 MG/ML
15 SYRINGE (ML) INJECTION ONCE
Refills: 0 | Status: DISCONTINUED | OUTPATIENT
Start: 2024-06-05 | End: 2024-06-05

## 2024-06-05 RX ORDER — INSULIN GLARGINE 100 [IU]/ML
40 INJECTION, SOLUTION SUBCUTANEOUS EVERY MORNING
Refills: 0 | Status: DISCONTINUED | OUTPATIENT
Start: 2024-06-06 | End: 2024-06-06

## 2024-06-05 RX ORDER — SODIUM CHLORIDE 9 MG/ML
1000 INJECTION INTRAMUSCULAR; INTRAVENOUS; SUBCUTANEOUS
Refills: 0 | Status: DISCONTINUED | OUTPATIENT
Start: 2024-06-05 | End: 2024-06-06

## 2024-06-05 RX ORDER — POTASSIUM CHLORIDE 20 MEQ
40 PACKET (EA) ORAL ONCE
Refills: 0 | Status: COMPLETED | OUTPATIENT
Start: 2024-06-05 | End: 2024-06-05

## 2024-06-05 RX ADMIN — Medication 2: at 11:23

## 2024-06-05 RX ADMIN — Medication 40 MILLIEQUIVALENT(S): at 10:09

## 2024-06-05 RX ADMIN — Medication 15 MILLIGRAM(S): at 22:06

## 2024-06-05 RX ADMIN — MORPHINE SULFATE 2 MILLIGRAM(S): 50 CAPSULE, EXTENDED RELEASE ORAL at 08:52

## 2024-06-05 RX ADMIN — SIMVASTATIN 20 MILLIGRAM(S): 20 TABLET, FILM COATED ORAL at 21:06

## 2024-06-05 RX ADMIN — PANTOPRAZOLE SODIUM 40 MILLIGRAM(S): 20 TABLET, DELAYED RELEASE ORAL at 08:53

## 2024-06-05 RX ADMIN — SODIUM CHLORIDE 100 MILLILITER(S): 9 INJECTION INTRAMUSCULAR; INTRAVENOUS; SUBCUTANEOUS at 22:56

## 2024-06-05 RX ADMIN — SODIUM CHLORIDE 100 MILLILITER(S): 9 INJECTION INTRAMUSCULAR; INTRAVENOUS; SUBCUTANEOUS at 08:52

## 2024-06-05 RX ADMIN — Medication 40 MILLIEQUIVALENT(S): at 11:23

## 2024-06-05 RX ADMIN — MORPHINE SULFATE 2 MILLIGRAM(S): 50 CAPSULE, EXTENDED RELEASE ORAL at 09:51

## 2024-06-05 RX ADMIN — INSULIN GLARGINE 60 UNIT(S): 100 INJECTION, SOLUTION SUBCUTANEOUS at 08:50

## 2024-06-05 RX ADMIN — Medication 15 MILLIGRAM(S): at 21:06

## 2024-06-05 RX ADMIN — Medication 3 MILLIGRAM(S): at 22:55

## 2024-06-05 RX ADMIN — LISINOPRIL 40 MILLIGRAM(S): 2.5 TABLET ORAL at 05:30

## 2024-06-05 RX ADMIN — SODIUM CHLORIDE 100 MILLILITER(S): 9 INJECTION INTRAMUSCULAR; INTRAVENOUS; SUBCUTANEOUS at 17:36

## 2024-06-05 NOTE — PROVIDER CONTACT NOTE (HYPOGLYCEMIA EVENT) - NS PROVIDER CONTACT BACKGROUND-HYPO
Age: 33y    Gender: Male    POCT Blood Glucose:  120 mg/dL (06-05-24 @ 16:06)  69 mg/dL (06-05-24 @ 15:50)  210 mg/dL (06-05-24 @ 10:54)  109 mg/dL (06-05-24 @ 07:52)  68 mg/dL (06-05-24 @ 07:38)  97 mg/dL (06-05-24 @ 03:31)  218 mg/dL (06-04-24 @ 23:59)  113 mg/dL (06-04-24 @ 21:24)      eMAR:  insulin glargine Injectable (LANTUS)   60 Unit(s) SubCutaneous (06-05-24 @ 08:50)    insulin lispro (ADMELOG) corrective regimen sliding scale   2 Unit(s) SubCutaneous (06-05-24 @ 11:23)    insulin regular  human recombinant   5 Unit(s) IV Push (06-04-24 @ 18:34)    simvastatin   20 milliGRAM(s) Oral (06-04-24 @ 23:01)    
Age: 33y    Gender: Male    POCT Blood Glucose:  109 mg/dL (06-05-24 @ 07:52)  68 mg/dL (06-05-24 @ 07:38)  97 mg/dL (06-05-24 @ 03:31)  218 mg/dL (06-04-24 @ 23:59)  113 mg/dL (06-04-24 @ 21:24)  198 mg/dL (06-04-24 @ 18:29)  257 mg/dL (06-04-24 @ 15:57)      eMAR:  insulin regular  human recombinant   5 Unit(s) IV Push (06-04-24 @ 18:34)    simvastatin   20 milliGRAM(s) Oral (06-04-24 @ 23:01)

## 2024-06-05 NOTE — PROVIDER CONTACT NOTE (OTHER) - SITUATION
pt complained of chest pain 8/10. Pt stated got morphine IVP in ED which expressed relief afterwards

## 2024-06-05 NOTE — PROGRESS NOTE ADULT - SUBJECTIVE AND OBJECTIVE BOX
PROGRESS NOTE:     Patient is a 33y old  Male who presents with a chief complaint of Mild DKA (04 Jun 2024 21:52)        SUBJECTIVE & OBJECTIVE:   Pt seen and examined at bedside in AM    no overnight events.   complaining of sharp CP this morning, diffuse , reproducible. denies fever, chills, URI, epigastric pain, recent travel    REVIEW OF SYSTEMS: remaining ROS negative     PHYSICAL EXAM:  T(C): 36.9 (06-05-24 @ 20:39), Max: 37.1 (06-04-24 @ 22:59)  HR: 90 (06-05-24 @ 20:39) (82 - 125)  BP: 124/74 (06-05-24 @ 20:39) (124/74 - 150/90)  RR: 20 (06-05-24 @ 20:39) (16 - 20)  SpO2: 98% (06-05-24 @ 20:39) (96% - 99%)  Wt(kg): -- Height (cm): 182.9 (06-04 @ 23:53)  Weight (kg): 75.4 (06-04 @ 23:53)  BMI (kg/m2): 22.5 (06-04 @ 23:53)  BSA (m2): 1.97 (06-04 @ 23:53)    I&O's Detail    05 Jun 2024 07:01  -  05 Jun 2024 22:48  --------------------------------------------------------  IN:    Oral Fluid: 720 mL    sodium chloride 0.9%: 1200 mL  Total IN: 1920 mL    OUT:  Total OUT: 0 mL    Total NET: 1920 mL          05 Jun 2024 07:01  -  05 Jun 2024 22:48  --------------------------------------------------------  IN:    Oral Fluid: 720 mL    sodium chloride 0.9%: 1200 mL  Total IN: 1920 mL    OUT:  Total OUT: 0 mL    Total NET: 1920 mL          GENERAL: NAD,  no increased WOB  HEAD:  Atraumatic, Normocephalic  EYES: EOMI, PERRLA, conjunctiva and sclera clear  ENMT: Moist mucous membranes  NECK: Supple, No JVD  NERVOUS SYSTEM:  Alert & Oriented X3, no focal neuro deficits   CHEST/LUNG: Clear to auscultation bilaterally; No rales, rhonchi, wheezing, or rubs; diffuse CP is reproducible   HEART: Regular rate and rhythm; No murmurs, rubs, or gallops  ABDOMEN: Soft, Nontender, Nondistended; Bowel sounds present  EXTREMITIES:  2+ Peripheral Pulses b/l, No clubbing, cyanosis, calf tenderness or edema b/l    MEDICATIONS  (STANDING):  dextrose 10% Bolus 125 milliLiter(s) IV Bolus once  dextrose 5%. 1000 milliLiter(s) (50 mL/Hr) IV Continuous <Continuous>  dextrose 5%. 1000 milliLiter(s) (100 mL/Hr) IV Continuous <Continuous>  dextrose 50% Injectable 12.5 Gram(s) IV Push once  dextrose 50% Injectable 25 Gram(s) IV Push once  glucagon  Injectable 1 milliGRAM(s) IntraMuscular once  insulin glargine Injectable (LANTUS) 60 Unit(s) SubCutaneous every morning  insulin lispro (ADMELOG) corrective regimen sliding scale   SubCutaneous three times a day before meals  lisinopril 40 milliGRAM(s) Oral daily  pantoprazole  Injectable 40 milliGRAM(s) IV Push daily  simvastatin 20 milliGRAM(s) Oral at bedtime  sodium chloride 0.9%. 1000 milliLiter(s) (100 mL/Hr) IV Continuous <Continuous>    MEDICATIONS  (PRN):  acetaminophen     Tablet .. 650 milliGRAM(s) Oral every 6 hours PRN Temp greater or equal to 38C (100.4F), Mild Pain (1 - 3)  aluminum hydroxide/magnesium hydroxide/simethicone Suspension 30 milliLiter(s) Oral every 4 hours PRN Dyspepsia  dextrose Oral Gel 15 Gram(s) Oral once PRN Blood Glucose LESS THAN 70 milliGRAM(s)/deciliter  melatonin 3 milliGRAM(s) Oral at bedtime PRN Insomnia  ondansetron Injectable 4 milliGRAM(s) IV Push every 8 hours PRN Nausea and/or Vomiting      LABS:                        12.0   9.95  )-----------( 281      ( 05 Jun 2024 07:06 )             35.5     06-05    136  |  100  |  6<L>  ----------------------------<  62<L>  3.3<L>   |  28  |  0.64    Ca    9.3      05 Jun 2024 07:06  Phos  2.5     06-05  Mg     1.8     06-05    TPro  7.2  /  Alb  3.3  /  TBili  1.8<H>  /  DBili  0.6<H>  /  AST  99<H>  /  ALT  83<H>  /  AlkPhos  132<H>  06-05      Urinalysis Basic - ( 05 Jun 2024 07:06 )    Color: x / Appearance: x / SG: x / pH: x  Gluc: 62 mg/dL / Ketone: x  / Bili: x / Urobili: x   Blood: x / Protein: x / Nitrite: x   Leuk Esterase: x / RBC: x / WBC x   Sq Epi: x / Non Sq Epi: x / Bacteria: x      Magnesium: 1.8 mg/dL (06-05 @ 07:06)    CAPILLARY BLOOD GLUCOSE      POCT Blood Glucose.: 77 mg/dL (05 Jun 2024 21:08)  POCT Blood Glucose.: 120 mg/dL (05 Jun 2024 16:06)  POCT Blood Glucose.: 69 mg/dL (05 Jun 2024 15:50)  POCT Blood Glucose.: 210 mg/dL (05 Jun 2024 10:54)  POCT Blood Glucose.: 109 mg/dL (05 Jun 2024 07:52)  POCT Blood Glucose.: 68 mg/dL (05 Jun 2024 07:38)  POCT Blood Glucose.: 97 mg/dL (05 Jun 2024 03:31)  POCT Blood Glucose.: 218 mg/dL (04 Jun 2024 23:59)      CARDIAC MARKERS ( 05 Jun 2024 07:06 )  x     / x     / 81 U/L / x     / 1.1 ng/mL        RECENT CULTURES:          RADIOLOGY & ADDITIONAL TESTS:          Radiology reports read and imaging reviewed  :  [ x] YES  [ ] NO  (I am not a radiologist and therefore rely on Radiologist reports to facilitate with diagnosis and treatment plans)    Consultant(s) Notes Reviewed:  [x ] YES  [ ] NO    Care Discussed with Consultants/Other Providers [x ] YES  [ ] NO  Care plan and all findings were discussed in detail with patient.  All questions and concerns addressed

## 2024-06-05 NOTE — PROGRESS NOTE ADULT - ASSESSMENT
33 year old male with PMHx of HTN, HLD, and IDDM1 who presented to the ED on 6/4/24 for complaints of abdominal pain and vomiting and admitted for mild DKA.    Mild DKA, improving  Complaints of epigastric and abdominal pain that started this AM with associated nausea and bilious emesis  Bicarb 17, AG 21, blood glucose 244 on admission, last known A1c 7.1 (on 6/13/22)  VBG 7.25 / 37 / 44 / 16, moderate acetone in blood, lactic acid WNL  S/p 2L NS bolus, insulin 5 U IV, famotidine 20 mg IV, metoclopramide 10 mg IV, and morphine 6 mg IV in the ED  Bicarb 50 mEq x 1 ordered  POC qac and qhs, PTA Lantus 60 U qam + Humalog SSI qac  Blood glucose goal < 180, f/u A1c  Monitor BMP and electrolytes closely    CP likely MSK  no e/o ACS   EKG with LVH and early repol   follow ECHO     Elevated alkaline phosphatase, improving  Transaminitis, improving  Alkphos 165, ,  on admission  R factor 2.2 which suggests mixed injury  RUQ U/S with fatty liver and without gallstones  Avoid hepatotoxic agents  F/u hepatitis panel, anti-smooth muscle Ab  Monitor LFTs    Leukocytosis, likely reactive  Afebrile, WBC 17.58K on admission  No signs of infection  Monitor WBC trend      Chronic medical conditions:   HTN: PTA ramipril 10 mg reordered as lisinopril 40 mg given ramipril not on formulary  HLD: PTA simvastatin 20 mg qhs

## 2024-06-05 NOTE — PATIENT PROFILE ADULT - FALL HARM RISK - CONCLUSION
Forwarded to RL.  Please review patient's Mychart message and advise.    Tabitha Simms, BS, RN, PHN  [facility]) [phone]       Universal Safety Interventions

## 2024-06-05 NOTE — PATIENT PROFILE ADULT - FALL HARM RISK - UNIVERSAL INTERVENTIONS
Bed in lowest position, wheels locked, appropriate side rails in place/Call bell, personal items and telephone in reach/Instruct patient to call for assistance before getting out of bed or chair/Non-slip footwear when patient is out of bed/[place] to call system/Physically safe environment - no spills, clutter or unnecessary equipment/Purposeful Proactive Rounding/Room/bathroom lighting operational, light cord in reach

## 2024-06-05 NOTE — PROVIDER CONTACT NOTE (HYPOGLYCEMIA EVENT) - NS PROVIDER CONTACT RECOMMEND-HYPO
Juice was given to pt. Rechecked blood sugar in 15 min and was 109. SHWETHA Baum made aware. SHWETHA Baum stated to still give 60u lantus with breakfast
Gave pt juice; retook sugar to be 120. SHWETHA Baum made aware.

## 2024-06-05 NOTE — CHART NOTE - NSCHARTNOTEFT_GEN_A_CORE
Called by RN to report patient having chest pain.  TRACIE HARRELL is a 33y old male Notified by RN patient C/O chest pain. Pt seen and evaluated at bedside. The pain began pior to coming to hospital while he was at home vomiting. Since then has occurred while in the ER last night. The pain is generalized across the chest and in the epigastric area. Described as 6/10 in severity, and does not radiate. The pain ir reproducible and aggravated by me palpating the chest.  The chest pain is not associated with palpitations, shortness of breath, diaphoresis, lightheadedness, or nausea.    Vital Signs Last 24 Hrs  T(C): 36.6 (05 Jun 2024 04:33), Max: 37.3 (04 Jun 2024 15:54)  T(F): 97.9 (05 Jun 2024 04:33), Max: 99.2 (04 Jun 2024 15:54)  HR: 95 (05 Jun 2024 08:38) (95 - 137)  BP: 146/94 (05 Jun 2024 08:38) (131/97 - 150/90)  BP(mean): --  RR: 18 (05 Jun 2024 08:38) (15 - 19)  SpO2: 99% (05 Jun 2024 08:38) (98% - 100%)    Parameters below as of 05 Jun 2024 08:38  Patient On (Oxygen Delivery Method): room air        Physical Exam:  General- lying supine comfortably in bed in NAD  Cardiac- normal S1, S2, RRR, no JVD  Resp-  regular with no use of accessory muscles, no wheezing rales or rhonchi.  Abdomen- soft non-tender, non-distended +BS  Extremities- NO edema bilaterally on LE.    ASSESSMENT/PLAN: TRACIE HARRELL is a 33y old male with chest pain.     1) Chest pain  - Likely 2/2 musculoskeletal pain, and excessive vomiting  -Protonix 40 mg IVP  - STAT EKG  - STAT cardiac enzymes   - I discussed at length with the patient the more common causes of chest pain. No cardiology follow-up is required unless new concerns arise.  - Discussed with  who agrees with plan above.   - Will continue to follow. RN to call for any changes. Called by RN to report patient having chest pain.  TRACIE HARRELL is a 33y old male Notified by RN patient C/O chest pain. Pt seen and evaluated at bedside. The pain began pior to coming to hospital while he was at home vomiting. Since then has occurred while in the ER last night. The pain is generalized across the chest and in the epigastric area. Described as 6/10 in severity, and does not radiate. The pain ir reproducible and aggravated by me palpating the chest.  The chest pain is not associated with palpitations, shortness of breath, diaphoresis, lightheadedness, or nausea.    Vital Signs Last 24 Hrs  T(C): 36.6 (05 Jun 2024 04:33), Max: 37.3 (04 Jun 2024 15:54)  T(F): 97.9 (05 Jun 2024 04:33), Max: 99.2 (04 Jun 2024 15:54)  HR: 95 (05 Jun 2024 08:38) (95 - 137)  BP: 146/94 (05 Jun 2024 08:38) (131/97 - 150/90)  BP(mean): --  RR: 18 (05 Jun 2024 08:38) (15 - 19)  SpO2: 99% (05 Jun 2024 08:38) (98% - 100%)    Parameters below as of 05 Jun 2024 08:38  Patient On (Oxygen Delivery Method): room air        Physical Exam:  General- lying supine comfortably in bed in NAD  Cardiac- normal S1, S2, RRR, no JVD  Resp-  regular with no use of accessory muscles, no wheezing rales or rhonchi.  Abdomen- soft non-tender, non-distended +BS  Extremities- NO edema bilaterally on LE.    ASSESSMENT/PLAN: TRACIE HARRELL is a 33y old male with chest pain.     1) Chest pain  - Likely 2/2 musculoskeletal pain, and excessive vomiting but will r/o cardiac etiology  -Protonix 40 mg IVP  - STAT EKG  - STAT cardiac enzymes   - Stat CXR  --Morphine 2 mg IVP  - I discussed at length with the patient the more common causes of chest pain.   -No cardiology follow-up is required unless new concerns arise.   - Will continue to follow. RN to call for any changes. Called by RN to report patient having chest pain.  TRACIE HARRELL is a 33y old male  admitted for DKA.  PMH IDDM, type 1, HTN. Notified by RN patient C/O chest pain.  Pt seen and evaluated at bedside. The pain began prior to coming to hospital while he was at home vomiting. Since then has occurred while in the ER last night. The pain is generalized across the chest and in the epigastric area. Described as 6/10 in severity, and does not radiate. The pain is reproducible and aggravated by me palpating the chest.  The chest pain is not associated with palpitations, shortness of breath, diaphoresis, lightheadedness, or nausea.    Vital Signs Last 24 Hrs  T(C): 36.6 (05 Jun 2024 04:33), Max: 37.3 (04 Jun 2024 15:54)  T(F): 97.9 (05 Jun 2024 04:33), Max: 99.2 (04 Jun 2024 15:54)  HR: 95 (05 Jun 2024 08:38) (95 - 137)  BP: 146/94 (05 Jun 2024 08:38) (131/97 - 150/90)  BP(mean): --  RR: 18 (05 Jun 2024 08:38) (15 - 19)  SpO2: 99% (05 Jun 2024 08:38) (98% - 100%)    Parameters below as of 05 Jun 2024 08:38  Patient On (Oxygen Delivery Method): room air        Physical Exam:  General- lying supine comfortably in bed in NAD  Cardiac- normal S1, S2, RRR, no JVD  Resp-  regular with no use of accessory muscles, no wheezing rales or rhonchi.  Abdomen- soft non-tender, non-distended +BS  Extremities- NO edema bilaterally on LE.    ASSESSMENT/PLAN: TRACIE HARRELL is a 33y old male with chest pain.     1) Chest pain  - Likely 2/2 musculoskeletal pain, and excessive vomiting but will r/o cardiac etiology  -Protonix 40 mg IVP  - STAT EKG  - STAT cardiac enzymes   - Stat CXR  --Morphine 2 mg IVP  - I discussed at length with the patient the more common causes of chest pain.   -No cardiology follow-up is required unless new concerns arise.   - Will continue to follow. RN to call for any changes. Called by RN to report patient having chest pain.  TRACIE HARRELL is a 33y old male  admitted for DKA.  PMH IDDM, type 1, HTN. Notified by RN patient C/O chest pain.  Pt seen and evaluated at bedside. The pain began prior to coming to hospital while he was at home vomiting. Since then has occurred while in the ER last night. The pain is generalized across the chest and in the epigastric area. Described as 6/10 in severity, and does not radiate. The pain is reproducible and aggravated by me palpating the chest.  The chest pain is not associated with palpitations, shortness of breath, diaphoresis, lightheadedness, or nausea.    Vital Signs Last 24 Hrs  T(C): 36.6 (05 Jun 2024 04:33), Max: 37.3 (04 Jun 2024 15:54)  T(F): 97.9 (05 Jun 2024 04:33), Max: 99.2 (04 Jun 2024 15:54)  HR: 95 (05 Jun 2024 08:38) (95 - 137)  BP: 146/94 (05 Jun 2024 08:38) (131/97 - 150/90)  BP(mean): --  RR: 18 (05 Jun 2024 08:38) (15 - 19)  SpO2: 99% (05 Jun 2024 08:38) (98% - 100%)    Parameters below as of 05 Jun 2024 08:38  Patient On (Oxygen Delivery Method): room air        Physical Exam:  General- lying supine comfortably in bed in NAD  Cardiac- normal S1, S2, RRR, no JVD  Resp-  regular with no use of accessory muscles, no wheezing rales or rhonchi.  Abdomen- soft non-tender, non-distended +BS  Extremities- NO edema bilaterally on LE.    ASSESSMENT/PLAN: TRACIE HARRELL is a 33y old male with chest pain.     1) Chest pain  - Likely 2/2 musculoskeletal pain, and excessive vomiting but will r/o cardiac etiology  -Protonix 40 mg IVP  - STAT EKG  - STAT cardiac enzymes   - Stat CXR  --Morphine 2 mg IVP  - I discussed at length with the patient the more common causes of chest pain.   -No cardiology follow-up is required unless new concerns arise.   - Will continue to follow. RN to call for any changes.    ADDENDUM 6/5/24 1700    EKG with LVH similar to present on admission  Cardiac enzymes WNL  Potassium 3.3 repleted and repeat ordered for AM  TTE pending.  No further c/o chest pain  Findings and treatment discussed with Hospitalist Dr Guerra

## 2024-06-06 ENCOUNTER — TRANSCRIPTION ENCOUNTER (OUTPATIENT)
Age: 34
End: 2024-06-06

## 2024-06-06 VITALS
TEMPERATURE: 99 F | RESPIRATION RATE: 18 BRPM | OXYGEN SATURATION: 97 % | DIASTOLIC BLOOD PRESSURE: 86 MMHG | HEART RATE: 94 BPM | SYSTOLIC BLOOD PRESSURE: 132 MMHG

## 2024-06-06 LAB
ANION GAP SERPL CALC-SCNC: 7 MMOL/L — SIGNIFICANT CHANGE UP (ref 5–17)
BUN SERPL-MCNC: 3 MG/DL — LOW (ref 7–23)
CALCIUM SERPL-MCNC: 8.4 MG/DL — LOW (ref 8.5–10.1)
CHLORIDE SERPL-SCNC: 101 MMOL/L — SIGNIFICANT CHANGE UP (ref 96–108)
CO2 SERPL-SCNC: 26 MMOL/L — SIGNIFICANT CHANGE UP (ref 22–31)
CREAT SERPL-MCNC: 0.52 MG/DL — SIGNIFICANT CHANGE UP (ref 0.5–1.3)
EGFR: 138 ML/MIN/1.73M2 — SIGNIFICANT CHANGE UP
GLUCOSE BLDC GLUCOMTR-MCNC: 121 MG/DL — HIGH (ref 70–99)
GLUCOSE BLDC GLUCOMTR-MCNC: 124 MG/DL — HIGH (ref 70–99)
GLUCOSE BLDC GLUCOMTR-MCNC: 141 MG/DL — HIGH (ref 70–99)
GLUCOSE SERPL-MCNC: 150 MG/DL — HIGH (ref 70–99)
HAV IGM SER-ACNC: SIGNIFICANT CHANGE UP
HBV CORE IGM SER-ACNC: SIGNIFICANT CHANGE UP
HBV SURFACE AG SER-ACNC: SIGNIFICANT CHANGE UP
HCV AB S/CO SERPL IA: 0.06 S/CO — SIGNIFICANT CHANGE UP (ref 0–0.99)
HCV AB SERPL-IMP: SIGNIFICANT CHANGE UP
POTASSIUM SERPL-MCNC: 3.4 MMOL/L — LOW (ref 3.5–5.3)
POTASSIUM SERPL-SCNC: 3.4 MMOL/L — LOW (ref 3.5–5.3)
SMOOTH MUSCLE AB SER-ACNC: SIGNIFICANT CHANGE UP
SODIUM SERPL-SCNC: 134 MMOL/L — LOW (ref 135–145)

## 2024-06-06 PROCEDURE — 99239 HOSP IP/OBS DSCHRG MGMT >30: CPT

## 2024-06-06 RX ORDER — ISOPROPYL ALCOHOL, BENZOCAINE .7; .06 ML/ML; ML/ML
0 SWAB TOPICAL
Qty: 100 | Refills: 1
Start: 2024-06-06

## 2024-06-06 RX ORDER — RAMIPRIL 5 MG
1 CAPSULE ORAL
Qty: 0 | Refills: 0 | DISCHARGE

## 2024-06-06 RX ORDER — POTASSIUM CHLORIDE 20 MEQ
40 PACKET (EA) ORAL ONCE
Refills: 0 | Status: COMPLETED | OUTPATIENT
Start: 2024-06-06 | End: 2024-06-06

## 2024-06-06 RX ORDER — THIAMINE MONONITRATE (VIT B1) 100 MG
1 TABLET ORAL
Qty: 0 | Refills: 0 | DISCHARGE

## 2024-06-06 RX ORDER — RAMIPRIL 5 MG
1 CAPSULE ORAL
Qty: 30 | Refills: 0
Start: 2024-06-06 | End: 2024-07-05

## 2024-06-06 RX ORDER — SIMVASTATIN 20 MG/1
1 TABLET, FILM COATED ORAL
Qty: 30 | Refills: 0
Start: 2024-06-06 | End: 2024-07-05

## 2024-06-06 RX ORDER — SIMVASTATIN 20 MG/1
1 TABLET, FILM COATED ORAL
Qty: 0 | Refills: 0 | DISCHARGE

## 2024-06-06 RX ADMIN — Medication 40 MILLIEQUIVALENT(S): at 10:20

## 2024-06-06 RX ADMIN — LISINOPRIL 40 MILLIGRAM(S): 2.5 TABLET ORAL at 05:23

## 2024-06-06 RX ADMIN — PANTOPRAZOLE SODIUM 40 MILLIGRAM(S): 20 TABLET, DELAYED RELEASE ORAL at 11:12

## 2024-06-06 RX ADMIN — INSULIN GLARGINE 40 UNIT(S): 100 INJECTION, SOLUTION SUBCUTANEOUS at 08:58

## 2024-06-06 RX ADMIN — Medication 3 MILLIGRAM(S): at 00:21

## 2024-06-06 NOTE — DISCHARGE NOTE PROVIDER - NSDCMRMEDTOKEN_GEN_ALL_CORE_FT
alcohol swabs: Apply topically to affected area 4 times a day  HumaLOG 100 units/mL subcutaneous solution: subcutaneous 3 times a day (before meals) sliding scale  Insulin Pen Needles, 4mm: 1 application subcutaneously 4 times a day. ** Use with insulin pen **  Lantus 100 units/mL subcutaneous solution: 60 unit(s) subcutaneous once a day  ramipril 10 mg oral capsule: 1 cap(s) orally once a day  simvastatin 20 mg oral tablet: 1 tab(s) orally once a day (at bedtime)  thiamine 100 mg oral tablet: 1 tab(s) orally once a day

## 2024-06-06 NOTE — CHART NOTE - NSCHARTNOTEFT_GEN_A_CORE
6/6/24  To whom it may concern:    Mr. Clem Brunner was admitted on 6/4/24 and discharged on 6/6/24. For medically necessary reasons, he was hospitalized at [facility] and should be excused from any workplace absence during this time.     If there are any questions please call at [phone][place] [address]    Thank you very much.

## 2024-06-06 NOTE — DISCHARGE NOTE NURSING/CASE MANAGEMENT/SOCIAL WORK - PATIENT PORTAL LINK FT
You can access the FollowMyHealth Patient Portal offered by [facility] by registering at the following website: [email or web address]. By joining [facility]’s FollowMyHealth portal, you will also be able to view your health information using other applications (apps) compatible with our system.

## 2024-06-06 NOTE — DISCHARGE NOTE PROVIDER - NSFOLLOWUPCLINICS_GEN_ALL_ED_FT
[facility]  Internal Medicine  [address] [place] [address]  Phone: [phone]  Fax:   Follow Up Time: 1 week    [facility]  Endocrinology  [address] [place] [id number]  Phone: [phone]  Fax:   Follow Up Time: 1 week

## 2024-06-06 NOTE — DISCHARGE NOTE PROVIDER - NSDCCPCAREPLAN_GEN_ALL_CORE_FT
PRINCIPAL DISCHARGE DIAGNOSIS  Diagnosis: DKA (diabetic ketoacidosis)  Assessment and Plan of Treatment:       SECONDARY DISCHARGE DIAGNOSES  Diagnosis: HTN (hypertension)  Assessment and Plan of Treatment:     Diagnosis: Diabetes  Assessment and Plan of Treatment: type I

## 2024-06-06 NOTE — DISCHARGE NOTE PROVIDER - HOSPITAL COURSE
33 year old male with PMHx of HTN, HLD, and IDDM1 who presented to the ED on 6/4/24 for complaints of abdominal pain and vomiting and admitted for mild DKA.    Vital Signs Last 24 Hrs  T(C): 36.4 (06 Jun 2024 10:27), Max: 36.9 (05 Jun 2024 20:39)  T(F): 97.6 (06 Jun 2024 10:27), Max: 98.5 (05 Jun 2024 20:39)  HR: 87 (06 Jun 2024 13:00) (69 - 97)  BP: 137/88 (06 Jun 2024 10:27) (119/75 - 137/88)  BP(mean): 89 (05 Jun 2024 16:51) (89 - 89)  RR: 18 (06 Jun 2024 10:27) (18 - 20)  SpO2: 99% (06 Jun 2024 10:27) (96% - 99%)    Parameters below as of 06 Jun 2024 10:27  Patient On (Oxygen Delivery Method): room air      Mild DKA, improving  Complaints of epigastric and abdominal pain that started this AM with associated nausea and bilious emesis  Bicarb 17, AG 21, blood glucose 244 on admission, last known A1c 7.1 (on 6/13/22)  VBG 7.25 / 37 / 44 / 16, moderate acetone in blood, lactic acid WNL  S/p 2L NS bolus, insulin 5 U IV, famotidine 20 mg IV, metoclopramide 10 mg IV, and morphine 6 mg IV in the ED  Bicarb 50 mEq x 1 ordered  POC qac and qhs, PTA Lantus 60 U qam + Humalog SSI qac  Blood glucose goal < 180, f/u A1c      CP likely MSK  no e/o ACS   EKG with LVH and early repol   ECHO normal     Elevated alkaline phosphatase, improving  Transaminitis, improving  Alkphos 165, ,  on admission  R factor 2.2 which suggests mixed injury  RUQ U/S with fatty liver and without gallstones  Avoid hepatotoxic agents  F/u hepatitis panel, anti-smooth muscle Ab       Leukocytosis, likely reactive  Afebrile, WBC 17.58K on admission  No signs of infection     Chronic medical conditions:   HTN: PTA ramipril 10 mg reordered as lisinopril 40 mg given ramipril not on formulary  HLD: PTA simvastatin 20 mg qhs    Discharge time : 40 min   RETURN PARAMETERS DISCUSSED WITH PATIENT, PATIENT EXPRESSED UNDERSTANDING AND IS AGREEABLE. DISCUSSED WITH PATIENT ON REFRAINING FROM DRIVING UNTIL FOLLOW-UP/ CLEARED BY PMD. PATIENT EXPRESSED UNDERSTANDING.    33 year old male with PMHx of HTN, HLD, and IDDM1 who presented to the ED on 6/4/24 for complaints of abdominal pain and vomiting and admitted for mild DKA.    Vital Signs Last 24 Hrs  T(C): 36.4 (06 Jun 2024 10:27), Max: 36.9 (05 Jun 2024 20:39)  T(F): 97.6 (06 Jun 2024 10:27), Max: 98.5 (05 Jun 2024 20:39)  HR: 87 (06 Jun 2024 13:00) (69 - 97)  BP: 137/88 (06 Jun 2024 10:27) (119/75 - 137/88)  BP(mean): 89 (05 Jun 2024 16:51) (89 - 89)  RR: 18 (06 Jun 2024 10:27) (18 - 20)  SpO2: 99% (06 Jun 2024 10:27) (96% - 99%) RA     GENERAL: NAD, no increased WOB  NERVOUS SYSTEM:  Alert & Oriented X3, Good concentration; nonfocal   CHEST/LUNG: CTAB;  No rales, rhonchi, wheezing, or rubs  HEART: Regular rate and rhythm; No murmurs, rubs, or gallops  ABDOMEN: Soft, Nontender, Nondistended; Bowel sounds present  EXTREMITIES:  2+ Peripheral Pulses b/l, No clubbing, cyanosis, calf tenderness or edema b/l         Mild DKA, improving  DM1   Complaints of epigastric and abdominal pain that started this AM with associated nausea and non bilious emesis>>resolved   no e/o infection , WBC normalized with IVF   Bicarb 17, AG 21, blood glucose 244 on admission   VBG 7.25 / 37 / 44 / 16, moderate acetone in blood, lactic acid WNL  S/p 2L NS bolus, insulin 5 U IV, famotidine 20 mg IV, metoclopramide 10 mg IV, and morphine 6 mg IV in the ED  Bicarb 50 mEq x 1   AG closed, tolerated diet   PTA Lantus 60 U qam + Humalog SSI qac, has continuous glucose monitor   A1c 6.3%       CP likely MSK, resolved   no e/o ACS   EKG with LVH and early repol   ECHO normal   CXR clear   patient doing well on room air     Leukocytosis, likely reactive  Afebrile, WBC 17.58K on admission  No signs of infection    Elevated alkaline phosphatase, improving  Transaminitis, improving  likely in setting of DKA and vomiting   RUQ U/S with fatty liver and without gallstones  hep panel neg   continue to hold simvastatin until follow-up with PMD , discussed with patient   followup with PMD within 1 week for repeat labs        hypokalemia -- repleted      Chronic medical conditions:   HTN: PTA ramipril 10 mg once daily   HLD: PTA simvastatin 20 mg qhs    Discharge time : 40 min   RETURN PARAMETERS DISCUSSED WITH PATIENT, PATIENT EXPRESSED UNDERSTANDING AND IS AGREEABLE. DISCUSSED WITH PATIENT ON REFRAINING FROM DRIVING UNTIL FOLLOW-UP/ CLEARED BY PMD. PATIENT EXPRESSED UNDERSTANDING.

## 2024-06-06 NOTE — DISCHARGE NOTE PROVIDER - NSDCFUADDAPPT_GEN_ALL_CORE_FT
It is important to see your primary physician as well as other necessary consultants within the next week to perform a comprehensive medical review.  Call their offices for an appointment as soon as you leave the hospital.  If you do not have a primary physician or cant reach him/her, contact the [facility] Physician Referral Service at [phone].  Your medical issues appear to be stable at this time, but if your symptoms recur or worsen, contact your physicians and/or return to the hospital if necessary.  If you encounter any issues or questions with your medication, call your physicians before stopping the medication.

## 2024-06-13 DIAGNOSIS — Z79.4 LONG TERM (CURRENT) USE OF INSULIN: ICD-10-CM

## 2024-06-13 DIAGNOSIS — E10.10 TYPE 1 DIABETES MELLITUS WITH KETOACIDOSIS WITHOUT COMA: ICD-10-CM

## 2024-06-13 DIAGNOSIS — I10 ESSENTIAL (PRIMARY) HYPERTENSION: ICD-10-CM

## 2024-06-13 DIAGNOSIS — K21.9 GASTRO-ESOPHAGEAL REFLUX DISEASE WITHOUT ESOPHAGITIS: ICD-10-CM

## 2024-06-13 DIAGNOSIS — D72.829 ELEVATED WHITE BLOOD CELL COUNT, UNSPECIFIED: ICD-10-CM

## 2024-06-13 DIAGNOSIS — R07.89 OTHER CHEST PAIN: ICD-10-CM

## 2024-06-13 DIAGNOSIS — E87.6 HYPOKALEMIA: ICD-10-CM

## 2024-06-13 DIAGNOSIS — E78.5 HYPERLIPIDEMIA, UNSPECIFIED: ICD-10-CM
